# Patient Record
Sex: FEMALE | HISPANIC OR LATINO | Employment: FULL TIME | URBAN - METROPOLITAN AREA
[De-identification: names, ages, dates, MRNs, and addresses within clinical notes are randomized per-mention and may not be internally consistent; named-entity substitution may affect disease eponyms.]

---

## 2017-01-16 ENCOUNTER — HOSPITAL ENCOUNTER (EMERGENCY)
Facility: MEDICAL CENTER | Age: 22
End: 2017-01-16
Attending: EMERGENCY MEDICINE
Payer: MEDICAID

## 2017-01-16 ENCOUNTER — APPOINTMENT (OUTPATIENT)
Dept: RADIOLOGY | Facility: MEDICAL CENTER | Age: 22
End: 2017-01-16
Attending: EMERGENCY MEDICINE
Payer: MEDICAID

## 2017-01-16 VITALS
DIASTOLIC BLOOD PRESSURE: 54 MMHG | RESPIRATION RATE: 18 BRPM | HEART RATE: 95 BPM | HEIGHT: 62 IN | TEMPERATURE: 97.4 F | OXYGEN SATURATION: 98 % | BODY MASS INDEX: 37.85 KG/M2 | WEIGHT: 205.69 LBS | SYSTOLIC BLOOD PRESSURE: 108 MMHG

## 2017-01-16 DIAGNOSIS — R00.2 PALPITATIONS: Primary | ICD-10-CM

## 2017-01-16 LAB
ALBUMIN SERPL BCP-MCNC: 4.4 G/DL (ref 3.2–4.9)
ALBUMIN/GLOB SERPL: 1.4 G/DL
ALP SERPL-CCNC: 68 U/L (ref 30–99)
ALT SERPL-CCNC: 17 U/L (ref 2–50)
ANION GAP SERPL CALC-SCNC: 11 MMOL/L (ref 0–11.9)
AST SERPL-CCNC: 20 U/L (ref 12–45)
BASOPHILS # BLD AUTO: 0.3 % (ref 0–1.8)
BASOPHILS # BLD: 0.04 K/UL (ref 0–0.12)
BILIRUB SERPL-MCNC: 0.7 MG/DL (ref 0.1–1.5)
BUN SERPL-MCNC: 15 MG/DL (ref 8–22)
CALCIUM SERPL-MCNC: 9.4 MG/DL (ref 8.5–10.5)
CHLORIDE SERPL-SCNC: 105 MMOL/L (ref 96–112)
CO2 SERPL-SCNC: 23 MMOL/L (ref 20–33)
CREAT SERPL-MCNC: 0.87 MG/DL (ref 0.5–1.4)
DEPRECATED D DIMER PPP IA-ACNC: <200 NG/ML(D-DU)
EKG IMPRESSION: NORMAL
EOSINOPHIL # BLD AUTO: 0.14 K/UL (ref 0–0.51)
EOSINOPHIL NFR BLD: 1.1 % (ref 0–6.9)
ERYTHROCYTE [DISTWIDTH] IN BLOOD BY AUTOMATED COUNT: 37.6 FL (ref 35.9–50)
GFR SERPL CREATININE-BSD FRML MDRD: >60 ML/MIN/1.73 M 2
GLOBULIN SER CALC-MCNC: 3.1 G/DL (ref 1.9–3.5)
GLUCOSE SERPL-MCNC: 107 MG/DL (ref 65–99)
HCG SERPL QL: NEGATIVE
HCT VFR BLD AUTO: 42.4 % (ref 37–47)
HGB BLD-MCNC: 13.9 G/DL (ref 12–16)
IMM GRANULOCYTES # BLD AUTO: 0.03 K/UL (ref 0–0.11)
IMM GRANULOCYTES NFR BLD AUTO: 0.2 % (ref 0–0.9)
LYMPHOCYTES # BLD AUTO: 2.61 K/UL (ref 1–4.8)
LYMPHOCYTES NFR BLD: 21.2 % (ref 22–41)
MAGNESIUM SERPL-MCNC: 1.7 MG/DL (ref 1.5–2.5)
MCH RBC QN AUTO: 27.7 PG (ref 27–33)
MCHC RBC AUTO-ENTMCNC: 32.8 G/DL (ref 33.6–35)
MCV RBC AUTO: 84.5 FL (ref 81.4–97.8)
MONOCYTES # BLD AUTO: 0.91 K/UL (ref 0–0.85)
MONOCYTES NFR BLD AUTO: 7.4 % (ref 0–13.4)
NEUTROPHILS # BLD AUTO: 8.6 K/UL (ref 2–7.15)
NEUTROPHILS NFR BLD: 69.8 % (ref 44–72)
NRBC # BLD AUTO: 0 K/UL
NRBC BLD AUTO-RTO: 0 /100 WBC
PHOSPHATE SERPL-MCNC: 2.1 MG/DL (ref 2.5–4.5)
PLATELET # BLD AUTO: 239 K/UL (ref 164–446)
PMV BLD AUTO: 11 FL (ref 9–12.9)
POTASSIUM SERPL-SCNC: 3.2 MMOL/L (ref 3.6–5.5)
PROT SERPL-MCNC: 7.5 G/DL (ref 6–8.2)
RBC # BLD AUTO: 5.02 M/UL (ref 4.2–5.4)
SODIUM SERPL-SCNC: 139 MMOL/L (ref 135–145)
TSH SERPL DL<=0.005 MIU/L-ACNC: 3.2 UIU/ML (ref 0.3–3.7)
WBC # BLD AUTO: 12.3 K/UL (ref 4.8–10.8)

## 2017-01-16 PROCEDURE — 83735 ASSAY OF MAGNESIUM: CPT

## 2017-01-16 PROCEDURE — 700105 HCHG RX REV CODE 258: Performed by: EMERGENCY MEDICINE

## 2017-01-16 PROCEDURE — 94760 N-INVAS EAR/PLS OXIMETRY 1: CPT

## 2017-01-16 PROCEDURE — 85379 FIBRIN DEGRADATION QUANT: CPT

## 2017-01-16 PROCEDURE — 80053 COMPREHEN METABOLIC PANEL: CPT

## 2017-01-16 PROCEDURE — 99284 EMERGENCY DEPT VISIT MOD MDM: CPT

## 2017-01-16 PROCEDURE — 84703 CHORIONIC GONADOTROPIN ASSAY: CPT

## 2017-01-16 PROCEDURE — 84443 ASSAY THYROID STIM HORMONE: CPT

## 2017-01-16 PROCEDURE — 85025 COMPLETE CBC W/AUTO DIFF WBC: CPT

## 2017-01-16 PROCEDURE — 93005 ELECTROCARDIOGRAM TRACING: CPT | Performed by: EMERGENCY MEDICINE

## 2017-01-16 PROCEDURE — 96374 THER/PROPH/DIAG INJ IV PUSH: CPT

## 2017-01-16 PROCEDURE — 71010 DX-CHEST-PORTABLE (1 VIEW): CPT

## 2017-01-16 PROCEDURE — 84100 ASSAY OF PHOSPHORUS: CPT

## 2017-01-16 PROCEDURE — 700111 HCHG RX REV CODE 636 W/ 250 OVERRIDE (IP): Performed by: EMERGENCY MEDICINE

## 2017-01-16 RX ORDER — ALBUTEROL SULFATE 90 UG/1
2 AEROSOL, METERED RESPIRATORY (INHALATION) EVERY 4 HOURS PRN
Qty: 1 INHALER | Refills: 0 | Status: SHIPPED | OUTPATIENT
Start: 2017-01-16 | End: 2023-09-20

## 2017-01-16 RX ORDER — SODIUM CHLORIDE 9 MG/ML
1000 INJECTION, SOLUTION INTRAVENOUS ONCE
Status: COMPLETED | OUTPATIENT
Start: 2017-01-16 | End: 2017-01-16

## 2017-01-16 RX ORDER — LORAZEPAM 2 MG/ML
0.5 INJECTION INTRAMUSCULAR ONCE
Status: COMPLETED | OUTPATIENT
Start: 2017-01-16 | End: 2017-01-16

## 2017-01-16 RX ADMIN — LORAZEPAM 0.5 MG: 2 INJECTION INTRAMUSCULAR; INTRAVENOUS at 00:54

## 2017-01-16 RX ADMIN — SODIUM CHLORIDE 1000 ML: 9 INJECTION, SOLUTION INTRAVENOUS at 00:52

## 2017-01-16 ASSESSMENT — PAIN SCALES - GENERAL: PAINLEVEL_OUTOF10: 0

## 2017-01-16 NOTE — ED NOTES
Pt dc home ambulatory w family verbalized understanding of dc instruction follow up and meds vitals stable symptoms improved

## 2017-01-16 NOTE — ED AVS SNAPSHOT
1/16/2017          Bette Corey  [address]   [place] [address]    Dear Bette:    [facility] wants to ensure your discharge home is safe and you or your loved ones have had all your questions answered regarding your care after you leave the hospital.    You may receive a telephone call within two days of your discharge.  This call is to make certain you understand your discharge instructions as well as ensure we provided you with the best care possible during your stay with us.     The call will only last approximately 3-5 minutes and will be done by a nurse.    Once again, we want to ensure your discharge home is safe and that you have a clear understanding of any next steps in your care.  If you have any questions or concerns, please do not hesitate to contact us, we are here for you.  Thank you for choosing [facility] for your healthcare needs.    Sincerely,    Favian Don    [facility]

## 2017-01-16 NOTE — ED AVS SNAPSHOT
Pervacio Access Code: [email or web address]  Expires: 2/15/2017  2:38 AM    Pervacio  A secure, online tool to manage your health information     [employer]’s Pervacio® is a secure, online tool that connects you to your personalized health information from the privacy of your home -- day or night - making it very easy for you to manage your healthcare. Once the activation process is completed, you can even access your medical information using the Pervacio mustapha, which is available for free in the Apple Mustapha store or Google Play store.     Pervacio provides the following levels of access (as shown below):   My Chart Features   [facility] Primary Care Doctor [facility]  Specialists [facility]  Urgent  Care Non-[facility]  Primary Care  Doctor   Email your healthcare team securely and privately 24/7 X X X X   Manage appointments: schedule your next appointment; view details of past/upcoming appointments X      Request prescription refills. X      View recent personal medical records, including lab and immunizations X X X X   View health record, including health history, allergies, medications X X X X   Read reports about your outpatient visits, procedures, consult and ER notes X X X X   See your discharge summary, which is a recap of your hospital and/or ER visit that includes your diagnosis, lab results, and care plan. X X       How to register for Pervacio:  1. Go to  [email or web address].  2. Click on the Sign Up Now box, which takes you to the New Member Sign Up page. You will need to provide the following information:  a. Enter your Pervacio Access Code exactly as it appears at the top of this page. (You will not need to use this code after you’ve completed the sign-up process. If you do not sign up before the expiration date, you must request a new code.)   b. Enter your date of birth.   c. Enter your home email address.   d. Click Submit, and follow the next screen’s instructions.  3. Create a Pervacio ID. This will be your Pervacio  login ID and cannot be changed, so think of one that is secure and easy to remember.  4. Create a Yapta password. You can change your password at any time.  5. Enter your Password Reset Question and Answer. This can be used at a later time if you forget your password.   6. Enter your e-mail address. This allows you to receive e-mail notifications when new information is available in Yapta.  7. Click Sign Up. You can now view your health information.    For assistance activating your Yapta account, call [phone]

## 2017-01-16 NOTE — ED NOTES
PT to room in wheel chair, was able to transfer to [facility] with no difficulty or assistance.RN aware.

## 2017-01-16 NOTE — ED PROVIDER NOTES
"ED Provider Note    CHIEF COMPLAINT  Chief Complaint   Patient presents with   • Rapid Heart Beat   • Difficulty Breathing     hx asthma   • Shaking       HPI  Bette Corey is a 21 y.o. female who presents to the emergency department with her family complaining of palpitations. Patient states that she has history of asthma, rare use of albuterol, exercise this evening and felt onset of palpitations during which increased after her exercise. Her exercise is limited due to her symptoms, palpitations or shortness of breath. No chest pain or tightness. Patient does describe feeling \"shaky,\" and is tremulous. Nausea without vomiting. Decreased appetite this evening. No syncope. Symptoms have persisted for more than 2-3 hours. She did try her albuterol inhaler without improvement in symptoms. Patient denies caffeine, energy drink or diet pill. Denies recent travel or sick contacts. She does have an IUD, Mirena for 2 and half years. She does describe a upwards of 30 pound weight gain over the past few months, without lifestyle changes, she did quit breast-feeding at that time.    REVIEW OF SYSTEMS  See HPI for further details. All other systems are negative.     PAST MEDICAL HISTORY    asthma, mild, controlled    SOCIAL HISTORY  Social History     Social History Main Topics   • Smoking status: Never Smoker    • Smokeless tobacco: Not on file   • Alcohol Use: No   • Drug Use: No   • Sexual Activity: Not on file       SURGICAL HISTORY   has past surgical history that includes other.    CURRENT MEDICATIONS  Home Medications     **Home medications have not yet been reviewed for this encounter**          ALLERGIES  Allergies   Allergen Reactions   • Other Food      peanuts       PHYSICAL EXAM  VITAL SIGNS: /63 mmHg  Pulse 83  Temp(Src) 36.3 °C (97.4 °F)  Resp 20  Ht 1.575 m (5' 2\")  Wt 93.3 kg (205 lb 11 oz)  BMI 37.61 kg/m2  SpO2 98%  Pulse ox interpretation: I interpret this pulse ox as " normal.  Constitutional: Alert in no apparent distress. Tremulous.  HENT: Normocephalic, atraumatic. Bilateral external ears normal, Nose normal. Moist mucous membranes.    Eyes: Pupils are equal and reactive, Conjunctiva normal.   Neck: Normal range of motion, Supple. No anterior midline mass.  Lymphatic: No lymphadenopathy noted.   Cardiovascular: Tachycardia otherwise regular rate and rhythm, no murmurs. Distal pulses intact.  No peripheral edema.  Thorax & Lungs: Normal breath sounds.  No wheezing/rales/ronchi. No increased work of breathing, clipped speech or retractions.   Abdomen: Soft, non-distended, non-tender to palpation.   Skin: Warm, Dry, No erythema, No rash.   Musculoskeletal: Good range of motion in all major joints.   Neurologic: Alert , no gross focal deficit noted.  Psychiatric: Affect normal, Judgment normal, Mood normal.       DIAGNOSTIC STUDIES / PROCEDURES    EKG  I interpreted this EKG myself.  This is a 12-lead study.  The rhythm is sinus with a rate of 92.  There are no ST segment abnormalities. Isolated T-wave flattening, inversion lead 3. Normal intervals.   Interpretation: No ST segment elevation myocardial infarction. No ectopy. No arrhythmia.  No previous EKG for comparison.    LABS  Results for orders placed or performed during the hospital encounter of 01/16/17   CBC WITH DIFFERENTIAL   Result Value Ref Range    WBC 12.3 (H) 4.8 - 10.8 K/uL    RBC 5.02 4.20 - 5.40 M/uL    Hemoglobin 13.9 12.0 - 16.0 g/dL    Hematocrit 42.4 37.0 - 47.0 %    MCV 84.5 81.4 - 97.8 fL    MCH 27.7 27.0 - 33.0 pg    MCHC 32.8 (L) 33.6 - 35.0 g/dL    RDW 37.6 35.9 - 50.0 fL    Platelet Count 239 164 - 446 K/uL    MPV 11.0 9.0 - 12.9 fL    Neutrophils-Polys 69.80 44.00 - 72.00 %    Lymphocytes 21.20 (L) 22.00 - 41.00 %    Monocytes 7.40 0.00 - 13.40 %    Eosinophils 1.10 0.00 - 6.90 %    Basophils 0.30 0.00 - 1.80 %    Immature Granulocytes 0.20 0.00 - 0.90 %    Nucleated RBC 0.00 /100 WBC    Neutrophils  (Absolute) 8.60 (H) 2.00 - 7.15 K/uL    Lymphs (Absolute) 2.61 1.00 - 4.80 K/uL    Monos (Absolute) 0.91 (H) 0.00 - 0.85 K/uL    Eos (Absolute) 0.14 0.00 - 0.51 K/uL    Baso (Absolute) 0.04 0.00 - 0.12 K/uL    Immature Granulocytes (abs) 0.03 0.00 - 0.11 K/uL    NRBC (Absolute) 0.00 K/uL   COMP METABOLIC PANEL   Result Value Ref Range    Sodium 139 135 - 145 mmol/L    Potassium 3.2 (L) 3.6 - 5.5 mmol/L    Chloride 105 96 - 112 mmol/L    Co2 23 20 - 33 mmol/L    Anion Gap 11.0 0.0 - 11.9    Glucose 107 (H) 65 - 99 mg/dL    Bun 15 8 - 22 mg/dL    Creatinine 0.87 0.50 - 1.40 mg/dL    Calcium 9.4 8.5 - 10.5 mg/dL    AST(SGOT) 20 12 - 45 U/L    ALT(SGPT) 17 2 - 50 U/L    Alkaline Phosphatase 68 30 - 99 U/L    Total Bilirubin 0.7 0.1 - 1.5 mg/dL    Albumin 4.4 3.2 - 4.9 g/dL    Total Protein 7.5 6.0 - 8.2 g/dL    Globulin 3.1 1.9 - 3.5 g/dL    A-G Ratio 1.4 g/dL   D-DIMER (only helpful in low pre-test probability wells critieria. Do not order if patient ruled out by PERC criteria. See Weblinks at top of Labs section)   Result Value Ref Range    D-Dimer Screen <200 <250 ng/mL(D-DU)   HCG Qual Serum   Result Value Ref Range    Beta-Hcg Qualitative Serum Negative Negative   TSH   Result Value Ref Range    TSH 3.200 0.300 - 3.700 uIU/mL   MAGNESIUM   Result Value Ref Range    Magnesium 1.7 1.5 - 2.5 mg/dL   PHOSPHORUS   Result Value Ref Range    Phosphorus 2.1 (L) 2.5 - 4.5 mg/dL   ESTIMATED GFR   Result Value Ref Range    GFR If African American >60 >60 mL/min/1.73 m 2    GFR If Non African American >60 >60 mL/min/1.73 m 2   EKG (NOW)   Result Value Ref Range    Report       [facility] Emergency Dept.    Test Date:  2017  Pt Name:    JARAD ARNOLD             Department: ER  MRN:        [id number]                      Room:       [facility]  Gender:     F                            Technician: [id number]  :        1995                   Requested By:RENATO TAYLOR  Order #:    [id number]                     Reading MD: SUE TAYLOR DO    Measurements  Intervals                                Axis  Rate:       92                           P:          24  ME:         168                          QRS:        74  QRSD:       84                           T:          27  QT:         348  QTc:        431    Interpretive Statements  SINUS RHYTHM  No previous ECG available for comparison    Electronically Signed On 1- 2:39:11 PST by SUE TAYLOR DO         RADIOLOGY  DX-CHEST-PORTABLE (1 VIEW)   Final Result      No acute cardiac or pulmonary abnormality is noted.          COURSE & MEDICAL DECISION MAKING  Nursing notes and vital signs were reviewed. (See chart for details)  The patients records were reviewed, history was obtained from the patient;    Evaluation of palpitations is unrevealing. Physical exam as above, patient was quite tremulous on arrival, mild tachycardia, otherwise unremarkable and neurologically intact. Labs are unremarkable, and the leukocytosis, anemia, electrolyte derangement. Normal thyroid study. PERC positive for tachycardia, low risk wells, d-dimer negative, doubt pulmonary embolism. No clinical or radiographic evidence for pneumonia, pneumothorax.  Patient received 1 L of IV fluid and 0.5 mg of IV Ativan complete resolution of her symptoms. There is no history for syncope. EKG is unremarkable.     Patient is stable for discharge at this time, anticipatory guidance provided, albuterol has been refilled, close follow-up is encouraged, and strict ED return instructions have been detailed. Patient is agreeable to the disposition and plan.    Patient's blood pressure was elevated in the emergency department, and has been referred to primary care for close monitoring.      FINAL IMPRESSION  (R00.2) Palpitations  (primary encounter diagnosis)      Electronically signed by: Sue Taylor, 1/16/2017 12:48 AM      This dictation was created using voice recognition software. The  accuracy of the dictation is limited to the abilities of the software. I expect there may be some errors of grammar and possibly content. The nursing notes were reviewed and certain aspects of this information were incorporated into this note.        Patient denies illicit drug use.

## 2017-01-16 NOTE — ED AVS SNAPSHOT
After Visit Summary                                                                                                                Bette Corey   MRN: [id number]    Department:  [facility], Emergency Dept   Date of Visit:  1/16/2017            [facility], Emergency Dept    [address] [address]    Phone:  [phone]      You were seen by     Sue Salgado D.O.      Your Diagnosis Was     Palpitations     R00.2       These are the medications you received during your hospitalization from 01/16/2017 0001 to 01/16/2017 0238     Date/Time Order Dose Route Action    01/16/2017 0052 NS infusion 1,000 mL 1,000 mL Intravenous New Bag    01/16/2017 0054 lorazepam (ATIVAN) injection 0.5 mg 0.5 mg Intravenous Given      Follow-up Information     1. Follow up with [facility] In 2 days.    Contact information    [address]  [facility]  [phone]          2. Follow up with [facility] In 2 days.    Contact information    [facility] [address] [phone]          3. Follow up with Lizzeth Soliman M.D. In 2 days.    Specialty:  Family Medicine    Contact information    [address]  [facility] [address] [phone]          4. Follow up with [facility] In 2 days.    Contact information    [address] [address] [phone]      Medication Information     Review all of your home medications and newly ordered medications with your primary doctor and/or pharmacist as soon as possible. Follow medication instructions as directed by your doctor and/or pharmacist.     Please keep your complete medication list with you and share with your physician. Update the information when medications are discontinued, doses are changed, or new medications (including over-the-counter products) are added; and carry medication information at all times in the event of emergency situations.               Medication List      START  taking these medications        Instructions    albuterol 108 (90 BASE) MCG/ACT Aers inhalation aerosol    Inhale 2 Puffs by mouth every four hours as needed for Shortness of Breath.   Dose:  2 Puff         ASK your doctor about these medications        Instructions    hydrocodone-acetaminophen 5-325 MG Tabs per tablet   Commonly known as:  NORCO    Take 1-2 Tabs by mouth every four hours as needed.   Dose:  1-2 Tab       ondansetron 4 MG Tabs tablet   Commonly known as:  ZOFRAN    Take 1 Tab by mouth every 8 hours as needed for Nausea/Vomiting.   Dose:  4 mg               Procedures and tests performed during your visit     CBC WITH DIFFERENTIAL    COMP METABOLIC PANEL    Cardiac Monitoring    D-DIMER (only helpful in low pre-test probability wells critieria. Do not order if patient ruled out by PERC criteria. See Weblinks at top of Labs section)    DX-CHEST-PORTABLE (1 VIEW)    EKG (NOW)    ESTIMATED GFR    HCG Qual Serum    IV Saline Lock    MAGNESIUM    PHOSPHORUS    Pulse Ox    TSH        Discharge Instructions       Follow up with primary care this week for reevaluation, to establish care, for close blood pressure monitoring and medication management.    Albuterol, 2 puffs inhaled every 4-6 hours as needed for dyspnea or wheezing for chronic asthma.  Encourage oral fluid hydration and well-balanced diet.  Activity exercise as tolerated.  Avoid caffeine, alcohol.    Return to emergency department for persistent palpitations, chest pain, shortness of breath, syncope, fever or other new concerns.    Palpitations  A palpitation is the feeling that your heartbeat is irregular. It may feel like your heart is fluttering or skipping a beat. It may also feel like your heart is beating faster than normal. This is usually not a serious problem. In some cases, you may need more medical tests.  HOME CARE  · Avoid:  ¨ Caffeine in coffee, tea, soft drinks, diet pills, and energy drinks.  ¨ Chocolate.  ¨ Alcohol.  · Stop  smoking if you smoke.  · Reduce your stress and anxiety. Try:  ¨ A method that measures bodily functions so you can learn to control them (biofeedback).  ¨ Yoga.  ¨ Meditation.  ¨ Physical activity such as swimming, jogging, or walking.  · Get plenty of rest and sleep.  GET HELP IF:  · Your fast or irregular heartbeat continues after 24 hours.  · Your palpitations occur more often.  GET HELP RIGHT AWAY IF:   · You have chest pain.  · You feel short of breath.  · You have a very bad headache.  · You feel dizzy or pass out (faint).  MAKE SURE YOU:   · Understand these instructions.  · Will watch your condition.  · Will get help right away if you are not doing well or get worse.     This information is not intended to replace advice given to you by your health care provider. Make sure you discuss any questions you have with your health care provider.     Document Released: 09/26/2009 Document Revised: 01/08/2016 Document Reviewed: 02/15/2013  [employer] Interactive Patient Education ©2016 [employer]            Patient Information     Patient Information    Following emergency treatment: all patient requiring follow-up care must return either to a private physician or a clinic if your condition worsens before you are able to obtain further medical attention, please return to the emergency room.     Billing Information    At [facility], we work to make the billing process streamlined for our patients.  Our Representatives are here to answer any questions you may have regarding your hospital bill.  If you have insurance coverage and have supplied your insurance information to us, we will submit a claim to your insurer on your behalf.  Should you have any questions regarding your bill, we can be reached online or by phone as follows:  Online: You are able pay your bills online or live chat with our representatives about any billing questions you may have. We are here to help Monday - Friday from 8:00am to 7:30pm and  9:00am - 12:00pm on Saturdays.  Please visit [email or web address]  for more information.   Phone:  [phone] or [phone]    Please note that your emergency physician, surgeon, pathologist, radiologist, anesthesiologist, and other specialists are not employed by [facility] and will therefore bill separately for their services.  Please contact them directly for any questions concerning their bills at the numbers below:     Emergency Physician Services:  [phone]  [facility]:  [phone]  [facility]:  [phone]  [facility]:  [phone]    1. Your final bill may vary from the amount quoted upon discharge if all procedures are not complete at that time, or if your doctor has additional procedures of which we are not aware. You will receive an additional bill if you return to the Emergency Department at [facility] for suture removal regardless of the facility of which the sutures were placed.     2. Please arrange for settlement of this account at the emergency registration.    3. All self-pay accounts are due in full at the time of treatment.  If you are unable to meet this obligation then payment is expected within 4-5 days.     4. If you have had radiology studies (CT, X-ray, Ultrasound, MRI), you have received a preliminary result during your emergency department visit. Please contact the radiology department [phone] to receive a copy of your final result. Please discuss the Final result with your primary physician or with the follow up physician provided.     Crisis Hotline:  [place] Crisis Hotline:  3-472-SYVHVTX or 1-499.919.6171  [place] Crisis Hotline:    1-235.313.3342 or 557-447-0003         ED Discharge Follow Up Questions    1. In order to provide you with very good care, we would like to follow up with a phone call in the next few days.  May we have your permission to contact you?     YES /   NO    2. What is the best phone number to call you? (       )_____-__________    3. What is the best time to call you?      Morning  /  Afternoon  /  Evening                   Patient Signature:  ____________________________________________________________    Date:  ____________________________________________________________

## 2017-01-16 NOTE — DISCHARGE INSTRUCTIONS
Follow up with primary care this week for reevaluation, to establish care, for close blood pressure monitoring and medication management.    Albuterol, 2 puffs inhaled every 4-6 hours as needed for dyspnea or wheezing for chronic asthma.  Encourage oral fluid hydration and well-balanced diet.  Activity exercise as tolerated.  Avoid caffeine, alcohol.    Return to emergency department for persistent palpitations, chest pain, shortness of breath, syncope, fever or other new concerns.    Palpitations  A palpitation is the feeling that your heartbeat is irregular. It may feel like your heart is fluttering or skipping a beat. It may also feel like your heart is beating faster than normal. This is usually not a serious problem. In some cases, you may need more medical tests.  HOME CARE  · Avoid:  ¨ Caffeine in coffee, tea, soft drinks, diet pills, and energy drinks.  ¨ Chocolate.  ¨ Alcohol.  · Stop smoking if you smoke.  · Reduce your stress and anxiety. Try:  ¨ A method that measures bodily functions so you can learn to control them (biofeedback).  ¨ Yoga.  ¨ Meditation.  ¨ Physical activity such as swimming, jogging, or walking.  · Get plenty of rest and sleep.  GET HELP IF:  · Your fast or irregular heartbeat continues after 24 hours.  · Your palpitations occur more often.  GET HELP RIGHT AWAY IF:   · You have chest pain.  · You feel short of breath.  · You have a very bad headache.  · You feel dizzy or pass out (faint).  MAKE SURE YOU:   · Understand these instructions.  · Will watch your condition.  · Will get help right away if you are not doing well or get worse.     This information is not intended to replace advice given to you by your health care provider. Make sure you discuss any questions you have with your health care provider.     Document Released: 09/26/2009 Document Revised: 01/08/2016 Document Reviewed: 02/15/2013  [employer] Interactive Patient Education ©2016 [employer]

## 2017-01-16 NOTE — ED NOTES
Pt to triage by w/c, states she has hx asthma & tried to exercise tonight but felt like her heart was racing, difficulty breathing & shaky.     in triage, in no obvious distress.

## 2018-04-23 ENCOUNTER — HOSPITAL ENCOUNTER (EMERGENCY)
Facility: MEDICAL CENTER | Age: 23
End: 2018-04-23
Attending: EMERGENCY MEDICINE
Payer: MEDICAID

## 2018-04-23 VITALS
TEMPERATURE: 97.8 F | WEIGHT: 175.93 LBS | SYSTOLIC BLOOD PRESSURE: 107 MMHG | RESPIRATION RATE: 16 BRPM | BODY MASS INDEX: 32.37 KG/M2 | HEIGHT: 62 IN | DIASTOLIC BLOOD PRESSURE: 57 MMHG | OXYGEN SATURATION: 98 % | HEART RATE: 66 BPM

## 2018-04-23 DIAGNOSIS — N93.9 VAGINAL BLEEDING: ICD-10-CM

## 2018-04-23 LAB
APPEARANCE UR: CLEAR
BACTERIA #/AREA URNS HPF: ABNORMAL /HPF
BASOPHILS # BLD AUTO: 0.8 % (ref 0–1.8)
BASOPHILS # BLD: 0.04 K/UL (ref 0–0.12)
BILIRUB UR QL STRIP.AUTO: NEGATIVE
COLOR UR: YELLOW
EOSINOPHIL # BLD AUTO: 0.29 K/UL (ref 0–0.51)
EOSINOPHIL NFR BLD: 5.7 % (ref 0–6.9)
EPI CELLS #/AREA URNS HPF: ABNORMAL /HPF
ERYTHROCYTE [DISTWIDTH] IN BLOOD BY AUTOMATED COUNT: 41.8 FL (ref 35.9–50)
GLUCOSE UR STRIP.AUTO-MCNC: NEGATIVE MG/DL
HCG SERPL QL: NEGATIVE
HCT VFR BLD AUTO: 39.8 % (ref 37–47)
HGB BLD-MCNC: 12.9 G/DL (ref 12–16)
HYALINE CASTS #/AREA URNS LPF: ABNORMAL /LPF
IMM GRANULOCYTES # BLD AUTO: 0.01 K/UL (ref 0–0.11)
IMM GRANULOCYTES NFR BLD AUTO: 0.2 % (ref 0–0.9)
KETONES UR STRIP.AUTO-MCNC: NEGATIVE MG/DL
LEUKOCYTE ESTERASE UR QL STRIP.AUTO: ABNORMAL
LYMPHOCYTES # BLD AUTO: 2.07 K/UL (ref 1–4.8)
LYMPHOCYTES NFR BLD: 40.6 % (ref 22–41)
MCH RBC QN AUTO: 28.4 PG (ref 27–33)
MCHC RBC AUTO-ENTMCNC: 32.4 G/DL (ref 33.6–35)
MCV RBC AUTO: 87.7 FL (ref 81.4–97.8)
MICRO URNS: ABNORMAL
MONOCYTES # BLD AUTO: 0.48 K/UL (ref 0–0.85)
MONOCYTES NFR BLD AUTO: 9.4 % (ref 0–13.4)
NEUTROPHILS # BLD AUTO: 2.21 K/UL (ref 2–7.15)
NEUTROPHILS NFR BLD: 43.3 % (ref 44–72)
NITRITE UR QL STRIP.AUTO: NEGATIVE
NRBC # BLD AUTO: 0 K/UL
NRBC BLD-RTO: 0 /100 WBC
PH UR STRIP.AUTO: 5.5 [PH]
PLATELET # BLD AUTO: 183 K/UL (ref 164–446)
PMV BLD AUTO: 11.7 FL (ref 9–12.9)
PROT UR QL STRIP: NEGATIVE MG/DL
RBC # BLD AUTO: 4.54 M/UL (ref 4.2–5.4)
RBC # URNS HPF: ABNORMAL /HPF
RBC UR QL AUTO: ABNORMAL
SP GR UR STRIP.AUTO: 1.01
UROBILINOGEN UR STRIP.AUTO-MCNC: 0.2 MG/DL
WBC # BLD AUTO: 5.1 K/UL (ref 4.8–10.8)
WBC #/AREA URNS HPF: ABNORMAL /HPF

## 2018-04-23 PROCEDURE — 99284 EMERGENCY DEPT VISIT MOD MDM: CPT

## 2018-04-23 PROCEDURE — 85025 COMPLETE CBC W/AUTO DIFF WBC: CPT

## 2018-04-23 PROCEDURE — 84703 CHORIONIC GONADOTROPIN ASSAY: CPT

## 2018-04-23 PROCEDURE — 81001 URINALYSIS AUTO W/SCOPE: CPT

## 2018-04-23 ASSESSMENT — PAIN SCALES - GENERAL: PAINLEVEL_OUTOF10: 8

## 2018-04-23 NOTE — ED NOTES
PT ambulated to [facility], chart up for MD.  PT to restroom for Urine sample.  PT unable to provide sample at this time     PT into gown, blankets given for comfort

## 2018-04-23 NOTE — ED TRIAGE NOTES
24 y/o female ambulatory to triage with c/o heavy vaginal bleeding that began this morning. Pt states she soaked through three regular sized tampons since she woke up at 4am. Pt states she has had intermittent spotting x 3 weeks. Denies chance of pregnancy but states she has had one miscarriage in the past.

## 2018-04-23 NOTE — ED PROVIDER NOTES
ED Provider Note    CHIEF COMPLAINT  Chief Complaint   Patient presents with   • Vaginal Bleeding     heavy bleeding today, spotting x 3 weeks       HPI  Bette Corey is a 23 y.o. female  here for evaluation of vaginal bleeding. The patient states that she had a normal period last month, but approximately 1-2 weeks ago, she had some spotting. Today she progressed to heavier vaginal bleeding with 3 tampons being used prior to arrival. She has no dizziness, no lightheadedness, and no weakness. She denies any history of the same. She states that she is on the Mirena for birth control, and she denies any chance of pregnancy. She has no dysuria, urgency or frequency. She has some lower abdominal cramping, but has not taken anything for the pain. She describes a cramping as aching and intermittent sharp.    PAST MEDICAL HISTORY   none    Family history; no bleeding disorders    SOCIAL HISTORY  Social History     Social History Main Topics   • Smoking status: Never Smoker   • Smokeless tobacco: Not on file   • Alcohol use No   • Drug use: No   • Sexual activity: Not on file       SURGICAL HISTORY   has a past surgical history that includes other.    CURRENT MEDICATIONS  Home Medications    **Home medications have not yet been reviewed for this encounter**         ALLERGIES  Allergies   Allergen Reactions   • Other Food      peanuts       REVIEW OF SYSTEMS  See HPI for further details. Review of systems as above, otherwise all other systems are negative.     PHYSICAL EXAM  Constitutional: Well developed, well nourished. No acute distress.  HEENT: Normocephalic, atraumatic. Posterior pharynx clear and moist.  Eyes:  EOMI. Normal sclera.  Neck: Supple, Full range of motion, nontender.  Chest/Pulmonary: clear to ausculation. Symmetrical expansion.   Cardio: Regular rate and rhythm with no murmur.   Abdomen: Soft, mild suprapubic tenderness, No peritoneal signs. No guarding. No palpable masses.  Back: No CVA  tenderness, nontender midline, no step offs.  Musculoskeletal: No deformity, no edema, neurovascular intact.   Neuro: Clear speech, appropriate, cooperative, cranial nerves II-XII grossly intact.  Psych: Normal mood and affect    Results for orders placed or performed during the hospital encounter of 04/23/18   CBC WITH DIFFERENTIAL   Result Value Ref Range    WBC 5.1 4.8 - 10.8 K/uL    RBC 4.54 4.20 - 5.40 M/uL    Hemoglobin 12.9 12.0 - 16.0 g/dL    Hematocrit 39.8 37.0 - 47.0 %    MCV 87.7 81.4 - 97.8 fL    MCH 28.4 27.0 - 33.0 pg    MCHC 32.4 (L) 33.6 - 35.0 g/dL    RDW 41.8 35.9 - 50.0 fL    Platelet Count 183 164 - 446 K/uL    MPV 11.7 9.0 - 12.9 fL    Neutrophils-Polys 43.30 (L) 44.00 - 72.00 %    Lymphocytes 40.60 22.00 - 41.00 %    Monocytes 9.40 0.00 - 13.40 %    Eosinophils 5.70 0.00 - 6.90 %    Basophils 0.80 0.00 - 1.80 %    Immature Granulocytes 0.20 0.00 - 0.90 %    Nucleated RBC 0.00 /100 WBC    Neutrophils (Absolute) 2.21 2.00 - 7.15 K/uL    Lymphs (Absolute) 2.07 1.00 - 4.80 K/uL    Monos (Absolute) 0.48 0.00 - 0.85 K/uL    Eos (Absolute) 0.29 0.00 - 0.51 K/uL    Baso (Absolute) 0.04 0.00 - 0.12 K/uL    Immature Granulocytes (abs) 0.01 0.00 - 0.11 K/uL    NRBC (Absolute) 0.00 K/uL   URINALYSIS CULTURE, IF INDICATED   Result Value Ref Range    Color Yellow     Character Clear     Specific Gravity 1.013 <1.035    Ph 5.5 5.0 - 8.0    Glucose Negative Negative mg/dL    Ketones Negative Negative mg/dL    Protein Negative Negative mg/dL    Bilirubin Negative Negative    Urobilinogen, Urine 0.2 Negative    Nitrite Negative Negative    Leukocyte Esterase Trace (A) Negative    Occult Blood Large (A) Negative    Micro Urine Req Microscopic    HCG QUAL SERUM   Result Value Ref Range    Beta-Hcg Qualitative Serum Negative Negative   URINE MICROSCOPIC (W/UA)   Result Value Ref Range    WBC 5-10 (A) /hpf    RBC 5-10 (A) /hpf    Bacteria Few (A) None /hpf    Epithelial Cells Few /hpf    Hyaline Cast 0-2 /lpf          PROCEDURES     MEDICAL RECORD  I have reviewed patient's medical record and pertinent results are listed above.    COURSE & MEDICAL DECISION MAKING  I have reviewed any medical record information, laboratory studies and radiographic results as noted above.    2:19 PM  The patient is nontoxic appearing, with a stable H&H, and has no dizziness. She agrees to follow-up with the GYN next 1-2 days, or come here for further issues or concerns.    If you have had any blood pressure issues while here in the emergency department, please see your doctor for a further evaluation or work up.    Differential diagnoses include but not limited to: vaginal bleeding, pregnancy,    This patient presents with vaginal bleeding .  At this time, I have counseled the patient/family regarding their medications, pain control, and follow up.  They will continue their medications, if any, as prescribed.  They will return immediately for any worsening symptoms and/or any other medical concerns.  They will see their doctor, or contact the doctor provided, in 1-2 days for follow up.       FINAL IMPRESSION  1. Vaginal bleeding            Electronically signed by: Salvatore Daigle, 4/23/2018 11:41 AM

## 2018-04-23 NOTE — DISCHARGE INSTRUCTIONS
Menorrhagia  Menorrhagia is the medical term for when your menstrual periods are heavy or last longer than usual. With menorrhagia, every period you have may cause enough blood loss and cramping that you are unable to maintain your usual activities.  CAUSES   In some cases, the cause of heavy periods is unknown, but a number of conditions may cause menorrhagia. Common causes include:  · A problem with the hormone-producing thyroid gland (hypothyroid).  · Noncancerous growths in the uterus (polyps or fibroids).  · An imbalance of the estrogen and progesterone hormones.  · One of your ovaries not releasing an egg during one or more months.  · Side effects of having an intrauterine device (IUD).  · Side effects of some medicines, such as anti-inflammatory medicines or blood thinners.  · A bleeding disorder that stops your blood from clotting normally.  SIGNS AND SYMPTOMS   During a normal period, bleeding lasts between 4 and 8 days. Signs that your periods are too heavy include:  · You routinely have to change your pad or tampon every 1 or 2 hours because it is completely soaked.  · You pass blood clots larger than 1 inch (2.5 cm) in size.  · You have bleeding for more than 7 days.  · You need to use pads and tampons at the same time because of heavy bleeding.  · You need to wake up to change your pads or tampons during the night.  · You have symptoms of anemia, such as tiredness, fatigue, or shortness of breath.   DIAGNOSIS   Your health care provider will perform a physical exam and ask you questions about your symptoms and menstrual history. Other tests may be ordered based on what the health care provider finds during the exam. These tests can include:  · Blood tests. Blood tests are used to check if you are pregnant or have hormonal changes, a bleeding or thyroid disorder, low iron levels (anemia), or other problems.  · Endometrial biopsy. Your health care provider takes a sample of tissue from the inside of your  uterus to be examined under a microscope.  · Pelvic ultrasound. This test uses sound waves to make a picture of your uterus, ovaries, and vagina. The pictures can show if you have fibroids or other growths.  · Hysteroscopy. For this test, your health care provider will use a small telescope to look inside your uterus.  Based on the results of your initial tests, your health care provider may recommend further testing.  TREATMENT   Treatment may not be needed. If it is needed, your health care provider may recommend treatment with one or more medicines first. If these do not reduce bleeding enough, a surgical treatment might be an option. The best treatment for you will depend on:   · Whether you need to prevent pregnancy.    · Your desire to have children in the future.  · The cause and severity of your bleeding.  · Your opinion and personal preference.    Medicines for menorrhagia may include:  · Birth control methods that use hormones. These include the pill, skin patch, vaginal ring, shots that you get every 3 months, hormonal IUD, and implant. These treatments reduce bleeding during your menstrual period.  · Medicines that thicken blood and slow bleeding.  · Medicines that reduce swelling, such as ibuprofen.   · Medicines that contain a synthetic hormone called progestin.    · Medicines that make the ovaries stop working for a short time.    You may need surgical treatment for menorrhagia if the medicines are unsuccessful. Treatment options include:  · Dilation and curettage (D&C). In this procedure, your health care provider opens (dilates) your cervix and then scrapes or suctions tissue from the lining of your uterus to reduce menstrual bleeding.  · Operative hysteroscopy. This procedure uses a tiny tube with a light (hysteroscope) to view your uterine cavity and can help in the surgical removal of a polyp that may be causing heavy periods.  · Endometrial ablation. Through various techniques, your health care  provider permanently destroys the entire lining of your uterus (endometrium). After endometrial ablation, most women have little or no menstrual flow. Endometrial ablation reduces your ability to become pregnant.  · Endometrial resection. This surgical procedure uses an electrosurgical wire loop to remove the lining of the uterus. This procedure also reduces your ability to become pregnant.  · Hysterectomy. Surgical removal of the uterus and cervix is a permanent procedure that stops menstrual periods. Pregnancy is not possible after a hysterectomy. This procedure requires anesthesia and hospitalization.  HOME CARE INSTRUCTIONS   · Only take over-the-counter or prescription medicines as directed by your health care provider. Take prescribed medicines exactly as directed. Do not change or switch medicines without consulting your health care provider.  · Take any prescribed iron pills exactly as directed by your health care provider. Long-term heavy bleeding may result in low iron levels. Iron pills help replace the iron your body lost from heavy bleeding. Iron may cause constipation. If this becomes a problem, increase the bran, fruits, and roughage in your diet.  · Do not take aspirin or medicines that contain aspirin 1 week before or during your menstrual period. Aspirin may make the bleeding worse.  · If you need to change your sanitary pad or tampon more than once every 2 hours, stay in bed and rest as much as possible until the bleeding stops.  · Eat well-balanced meals. Eat foods high in iron. Examples are leafy green vegetables, meat, liver, eggs, and whole grain breads and cereals. Do not try to lose weight until the abnormal bleeding has stopped and your blood iron level is back to normal.  SEEK MEDICAL CARE IF:   · You soak through a pad or tampon every 1 or 2 hours, and this happens every time you have a period.  · You need to use pads and tampons at the same time because you are bleeding so much.  · You  need to change your pad or tampon during the night.  · You have a period that lasts for more than 8 days.  · You pass clots bigger than 1 inch wide.  · You have irregular periods that happen more or less often than once a month.  · You feel dizzy or faint.  · You feel very weak or tired.  · You feel short of breath or feel your heart is beating too fast when you exercise.  · You have nausea and vomiting or diarrhea while you are taking your medicine.  · You have any problems that may be related to the medicine you are taking.  SEEK IMMEDIATE MEDICAL CARE IF:   · You soak through 4 or more pads or tampons in 2 hours.  · You have any bleeding while you are pregnant.  MAKE SURE YOU:   · Understand these instructions.  Will watch your condition.  Metrorrhagia  Metrorrhagia is bleeding from the uterus that happens irregularly but often. The bleeding generally happens between menstrual periods.  Follow these instructions at home:  Pay attention to any changes in your symptoms. Follow these instructions to help with your condition:  Eating and drinking  · Eat well-balanced meals. Include foods that are high in iron, such as liver, meat, shellfish, green leafy vegetables, and eggs.  · If you become constipated:  ¨ Drink plenty of water.  ¨ Eat fruits and vegetables that are high in water and fiber, such as spinach, carrots, raspberries, apples, and luisa.  Medicines  · Take over-the-counter and prescription medicines only as told by your health care provider.  · Do not change medicines without talking with your health care provider.  · Aspirin or medicines that contain aspirin may make the bleeding worse. Do not take those medicines:  ¨ During the week before your period.  ¨ During your period.  · If you were prescribed iron pills, take them as told by your health care provider. Iron pills help to replace iron that your body loses because of this condition.  Activity  · If you need to change your sanitary pad or tampon more  than one time every 2 hours:  ¨ Lie in bed with your feet raised (elevated).  ¨ Place a cold pack on your lower abdomen.  ¨ Rest as much as possible until the bleeding stops or slows down.  · Do not try to lose weight until the bleeding has stopped and your blood iron level is back to normal.  Other Instructions  · For two months, write down:  ¨ When your period starts.  ¨ When your period ends.  ¨ When any abnormal bleeding occurs.  ¨ What problems you notice.  · Keep all follow-up visits as told by your health care provider. This is important.  Contact a health care provider if:  · You get light-headed or weak.  · You have nausea and vomiting.  · You cannot eat or drink without vomiting.  · You feel dizzy or have diarrhea while you are taking medicine.  · You are taking birth control pills or hormones, and you want to change them or stop taking them.  Get help right away if:  · You develop a fever or chills.  · You need to change your sanitary pad or tampon more than one time per hour.  · Your bleeding becomes heavy.  · Your flow contains clots.  · You develop pain in your abdomen.  · You lose consciousness.  · You develop a rash.  This information is not intended to replace advice given to you by your health care provider. Make sure you discuss any questions you have with your health care provider.  Document Released: 12/18/2006 Document Revised: 05/22/2017 Document Reviewed: 03/14/2016  [employer] Interactive Patient Education © 2017 [employer]  ·   · Will get help right away if you are not doing well or get worse.  This information is not intended to replace advice given to you by your health care provider. Make sure you discuss any questions you have with your health care provider.  Document Released: 12/18/2006 Document Revised: 04/10/2017 Document Reviewed: 06/08/2014  [employer] Interactive Patient Education © 2017 [employer]

## 2018-10-04 ENCOUNTER — OFFICE VISIT (OUTPATIENT)
Dept: URGENT CARE | Facility: CLINIC | Age: 23
End: 2018-10-04

## 2018-10-04 VITALS
SYSTOLIC BLOOD PRESSURE: 124 MMHG | BODY MASS INDEX: 32.39 KG/M2 | HEIGHT: 62 IN | DIASTOLIC BLOOD PRESSURE: 76 MMHG | OXYGEN SATURATION: 98 % | TEMPERATURE: 98.6 F | RESPIRATION RATE: 18 BRPM | WEIGHT: 176 LBS | HEART RATE: 82 BPM

## 2018-10-04 DIAGNOSIS — Z02.1 PRE-EMPLOYMENT DRUG SCREENING: ICD-10-CM

## 2018-10-04 DIAGNOSIS — Z02.1 ENCOUNTER FOR PRE-EMPLOYMENT EXAMINATION: ICD-10-CM

## 2018-10-04 LAB
AMP AMPHETAMINE: NORMAL
BAR BARBITURATES: NORMAL
BZO BENZODIAZEPINES: NORMAL
COC COCAINE: NORMAL
INT CON NEG: NORMAL
INT CON POS: NORMAL
MDMA ECSTASY: NORMAL
MET METHAMPHETAMINES: NORMAL
MTD METHADONE: NORMAL
OPI OPIATES: NORMAL
OXY OXYCODONE: NORMAL
PCP PHENCYCLIDINE: NORMAL
POC URINE DRUG SCREEN OCDRS: NEGATIVE
THC: NORMAL

## 2018-10-04 PROCEDURE — 8915 PR COMPREHENSIVE PHYSICAL: Performed by: PHYSICIAN ASSISTANT

## 2018-10-04 PROCEDURE — 92553 AUDIOMETRY AIR & BONE: CPT | Performed by: PHYSICIAN ASSISTANT

## 2018-10-04 PROCEDURE — 80305 DRUG TEST PRSMV DIR OPT OBS: CPT | Performed by: PHYSICIAN ASSISTANT

## 2019-12-09 ENCOUNTER — OFFICE VISIT (OUTPATIENT)
Dept: URGENT CARE | Facility: PHYSICIAN GROUP | Age: 24
End: 2019-12-09
Payer: COMMERCIAL

## 2019-12-09 VITALS
WEIGHT: 189 LBS | TEMPERATURE: 98.6 F | OXYGEN SATURATION: 99 % | HEIGHT: 62 IN | HEART RATE: 70 BPM | SYSTOLIC BLOOD PRESSURE: 102 MMHG | DIASTOLIC BLOOD PRESSURE: 68 MMHG | BODY MASS INDEX: 34.78 KG/M2

## 2019-12-09 DIAGNOSIS — A09 TRAVELERS' DIARRHEA: ICD-10-CM

## 2019-12-09 DIAGNOSIS — R11.2 NAUSEA AND VOMITING, INTRACTABILITY OF VOMITING NOT SPECIFIED, UNSPECIFIED VOMITING TYPE: ICD-10-CM

## 2019-12-09 LAB
APPEARANCE UR: CLEAR
BILIRUB UR STRIP-MCNC: NORMAL MG/DL
COLOR UR AUTO: YELLOW
GLUCOSE UR STRIP.AUTO-MCNC: NORMAL MG/DL
INT CON NEG: NEGATIVE
INT CON POS: POSITIVE
KETONES UR STRIP.AUTO-MCNC: NORMAL MG/DL
LEUKOCYTE ESTERASE UR QL STRIP.AUTO: NORMAL
NITRITE UR QL STRIP.AUTO: NORMAL
PH UR STRIP.AUTO: 7 [PH] (ref 5–8)
POC URINE PREGNANCY TEST: NORMAL
PROT UR QL STRIP: NORMAL MG/DL
RBC UR QL AUTO: NORMAL
SP GR UR STRIP.AUTO: 1.02
UROBILINOGEN UR STRIP-MCNC: 0.2 MG/DL

## 2019-12-09 PROCEDURE — 81025 URINE PREGNANCY TEST: CPT | Performed by: EMERGENCY MEDICINE

## 2019-12-09 PROCEDURE — 99203 OFFICE O/P NEW LOW 30 MIN: CPT | Performed by: EMERGENCY MEDICINE

## 2019-12-09 PROCEDURE — 81002 URINALYSIS NONAUTO W/O SCOPE: CPT | Performed by: EMERGENCY MEDICINE

## 2019-12-09 RX ORDER — ONDANSETRON 4 MG/1
4 TABLET, FILM COATED ORAL EVERY 8 HOURS PRN
Qty: 6 TAB | Refills: 0 | Status: SHIPPED | OUTPATIENT
Start: 2019-12-09 | End: 2019-12-11

## 2019-12-09 ASSESSMENT — ENCOUNTER SYMPTOMS
ABDOMINAL PAIN: 1
FEVER: 0
VOMITING: 1
FOCAL WEAKNESS: 0
BLOOD IN STOOL: 0
CHANGE IN BOWEL HABIT: 1
NAUSEA: 1
DIARRHEA: 1

## 2019-12-09 NOTE — PROGRESS NOTES
Subjective:      Bette Corey is a 24 y.o. female who presents with Abdominal Pain (x1wks stomach pains diarrhea, nausea vomiting  recent travels to [place])            GI Problem   This is a new problem. The current episode started in the past 7 days. The problem occurs daily. The problem has been unchanged. Associated symptoms include abdominal pain, a change in bowel habit, nausea and vomiting. Pertinent negatives include no fever, rash or urinary symptoms. The symptoms are aggravated by exertion and drinking. She has tried rest and drinking for the symptoms. The treatment provided no relief.   No exposure to diarrheal illness known, no antibiotic use recently.  Onset of symptoms 1 day after travel to [place].    Review of Systems   Constitutional: Negative for fever.   Gastrointestinal: Positive for abdominal pain, change in bowel habit, diarrhea, nausea and vomiting. Negative for blood in stool.        Nausea and vomiting once a day, diarrhea about 8 stools a day   Genitourinary: Negative for dysuria, frequency and urgency.        Denies pregnancy.  LMP this past week   Skin: Negative for rash.   Neurological: Negative for focal weakness.     PMH:  has no past medical history of ASTHMA, CAD (coronary artery disease), Cancer ([facility]), Congestive heart failure (HCC), COPD, Diabetes, Infectious disease, or Stroke ([facility]).  MEDS:   Current Outpatient Medications:   •  riFAXIMin (XIFAXAN) 200 MG Tab, Take 1 Tab by mouth 2 Times a Day for 3 days., Disp: 6 Tab, Rfl: 0  •  ondansetron (ZOFRAN) 4 MG Tab tablet, Take 1 Tab by mouth every 8 hours as needed for Nausea/Vomiting for up to 2 days., Disp: 6 Tab, Rfl: 0  •  albuterol 108 (90 BASE) MCG/ACT Aero Soln inhalation aerosol, Inhale 2 Puffs by mouth every four hours as needed for Shortness of Breath. (Patient not taking: Reported on 12/9/2019), Disp: 1 Inhaler, Rfl: 0  •  hydrocodone-acetaminophen (NORCO) 5-325 MG Tab per tablet, Take 1-2 Tabs by mouth every four  "hours as needed. (Patient not taking: Reported on 12/9/2019), Disp: 10 Tab, Rfl: 0  •  ondansetron (ZOFRAN) 4 MG Tab tablet, Take 1 Tab by mouth every 8 hours as needed for Nausea/Vomiting. (Patient not taking: Reported on 12/9/2019), Disp: 10 Tab, Rfl: 0  ALLERGIES:   Allergies   Allergen Reactions   • Other Food      peanuts     SURGHX:   Past Surgical History:   Procedure Laterality Date   • OTHER      tonsillectomy     SOCHX:  reports that she has never smoked. She has never used smokeless tobacco. She reports that she does not drink alcohol or use drugs.  FH: family history is not on file.   Objective:     /68   Pulse 70   Temp 37 °C (98.6 °F) (Temporal)   Ht 1.575 m (5' 2\")   Wt 85.7 kg (189 lb)   LMP 12/06/2019   SpO2 99%   BMI 34.57 kg/m²      Physical Exam  Constitutional:       General: She is not in acute distress.     Appearance: Normal appearance. She is well-developed. She is not ill-appearing or toxic-appearing.   HENT:      Head: Normocephalic.      Mouth/Throat:      Pharynx: Oropharynx is clear.   Eyes:      General: Lids are normal.      Conjunctiva/sclera: Conjunctivae normal.   Neck:      Vascular: No JVD.      Trachea: Phonation normal.   Cardiovascular:      Rate and Rhythm: Normal rate and regular rhythm.      Heart sounds: Normal heart sounds.   Pulmonary:      Effort: Pulmonary effort is normal.      Breath sounds: Normal breath sounds.   Abdominal:      General: Bowel sounds are normal. There is no distension.      Palpations: Abdomen is soft.      Tenderness: There is generalized tenderness. There is no guarding or rebound.      Hernia: There is no hernia in the umbilical area.   Skin:     General: Skin is warm and dry.   Neurological:      Mental Status: She is alert and oriented to person, place, and time.   Psychiatric:         Behavior: Behavior is cooperative.                 Assessment/Plan:       1. Travelers' diarrhea  - riFAXIMin (XIFAXAN) 200 MG Tab; Take 1 Tab by " mouth 2 Times a Day for 3 days.  Dispense: 6 Tab; Refill: 0  - Complete O&P; Future  - STOOL WBC'S; Future    2. Nausea and vomiting, intractability of vomiting not specified, unspecified vomiting type  Positive blood- POCT Urinalysis  negative- POCT Pregnancy  - ondansetron (ZOFRAN) 4 MG Tab tablet; Take 1 Tab by mouth every 8 hours as needed for Nausea/Vomiting for up to 2 days.  Dispense: 6 Tab; Refill: 0

## 2020-05-15 ENCOUNTER — OFFICE VISIT (OUTPATIENT)
Dept: URGENT CARE | Facility: PHYSICIAN GROUP | Age: 25
End: 2020-05-15
Payer: COMMERCIAL

## 2020-05-15 VITALS
TEMPERATURE: 98.4 F | HEIGHT: 62 IN | WEIGHT: 194 LBS | BODY MASS INDEX: 35.7 KG/M2 | RESPIRATION RATE: 14 BRPM | DIASTOLIC BLOOD PRESSURE: 68 MMHG | SYSTOLIC BLOOD PRESSURE: 104 MMHG | OXYGEN SATURATION: 98 % | HEART RATE: 68 BPM

## 2020-05-15 DIAGNOSIS — J45.20 MILD INTERMITTENT ASTHMA WITHOUT COMPLICATION: ICD-10-CM

## 2020-05-15 PROCEDURE — 99214 OFFICE O/P EST MOD 30 MIN: CPT | Performed by: PHYSICIAN ASSISTANT

## 2020-05-15 RX ORDER — ALBUTEROL SULFATE 90 UG/1
2 AEROSOL, METERED RESPIRATORY (INHALATION) EVERY 6 HOURS PRN
Qty: 8.5 G | Refills: 0 | Status: SHIPPED | OUTPATIENT
Start: 2020-05-15 | End: 2023-09-18

## 2020-05-15 ASSESSMENT — ENCOUNTER SYMPTOMS
RESPIRATORY NEGATIVE: 1
HEADACHES: 0
CHILLS: 0
SHORTNESS OF BREATH: 0
EYES NEGATIVE: 1
GASTROINTESTINAL NEGATIVE: 1
FEVER: 0
COUGH: 0
WHEEZING: 0

## 2020-05-15 NOTE — PROGRESS NOTES
"Subjective:      Bette Aguilar is a 25 y.o. female who presents with Medication Refill (Inhaler)          HPI  The patient is a 25-year-old female with a history of mild intermittent asthma mostly exacerbated by exercise and increased activity.  He reports of mild increase and wheezing and mild coughing during exercise and walking around for the past couple weeks.  She is here for a medication refill on her albuterol inhaler.  She denies any current cough or wheezing or SOB.  Denies any fever, chills, sore throat, runny nose, chest pain or shortness of breath.  She has no other complaints or concerns.  She denies any other pertinent past medical history.      Review of Systems   Constitutional: Negative for chills, fever and malaise/fatigue.   Eyes: Negative.    Respiratory: Negative.  Negative for cough, shortness of breath and wheezing.    Cardiovascular: Negative for chest pain.   Gastrointestinal: Negative.    Skin: Negative.    Neurological: Negative for headaches.          Objective:     /68 (BP Location: Left arm, Patient Position: Sitting, BP Cuff Size: Adult)   Pulse 68   Temp 36.9 °C (98.4 °F) (Temporal)   Resp 14   Ht 1.575 m (5' 2\")   Wt 88 kg (194 lb)   SpO2 98%   BMI 35.48 kg/m²      Physical Exam  Vitals signs reviewed.   Constitutional:       General: She is not in acute distress.     Appearance: Normal appearance. She is not ill-appearing or toxic-appearing.   HENT:      Right Ear: Tympanic membrane normal.      Left Ear: Tympanic membrane normal.      Nose: Nose normal.      Mouth/Throat:      Mouth: Mucous membranes are moist.      Pharynx: Oropharynx is clear. No oropharyngeal exudate or posterior oropharyngeal erythema.   Eyes:      Conjunctiva/sclera: Conjunctivae normal.      Pupils: Pupils are equal, round, and reactive to light.   Cardiovascular:      Rate and Rhythm: Normal rate and regular rhythm.      Heart sounds: Normal heart sounds.   Pulmonary:      Effort: " Pulmonary effort is normal. No respiratory distress.      Breath sounds: Normal breath sounds. No wheezing, rhonchi or rales.   Lymphadenopathy:      Cervical: No cervical adenopathy.   Skin:     General: Skin is warm and dry.      Findings: No rash.   Neurological:      General: No focal deficit present.      Mental Status: She is alert and oriented to person, place, and time.   Psychiatric:         Mood and Affect: Mood normal.         Behavior: Behavior normal.       History reviewed. No pertinent past medical history.   Past Surgical History:   Procedure Laterality Date   • OTHER      tonsillectomy      Social History     Socioeconomic History   • Marital status:      Spouse name: Not on file   • Number of children: Not on file   • Years of education: Not on file   • Highest education level: Not on file   Occupational History   • Not on file   Social Needs   • Financial resource strain: Not on file   • Food insecurity     Worry: Not on file     Inability: Not on file   • Transportation needs     Medical: Not on file     Non-medical: Not on file   Tobacco Use   • Smoking status: Never Smoker   • Smokeless tobacco: Never Used   Substance and Sexual Activity   • Alcohol use: No   • Drug use: No   • Sexual activity: Not on file   Lifestyle   • Physical activity     Days per week: Not on file     Minutes per session: Not on file   • Stress: Not on file   Relationships   • Social connections     Talks on phone: Not on file     Gets together: Not on file     Attends [employer] service: Not on file     Active member of club or organization: Not on file     Attends meetings of clubs or organizations: Not on file     Relationship status: Not on file   • Intimate partner violence     Fear of current or ex partner: Not on file     Emotionally abused: Not on file     Physically abused: Not on file     Forced sexual activity: Not on file   Other Topics Concern   • Not on file   Social History Narrative   • Not on file     Other food            Assessment/Plan:     1. Mild intermittent asthma without complication  - albuterol 108 (90 Base) MCG/ACT Aero Soln inhalation aerosol; Inhale 2 Puffs by mouth every 6 hours as needed for Shortness of Breath.  Dispense: 8.5 g; Refill: 0  - REFERRAL TO FAMILY PRACTICE    Refill on albuterol inhaler.  Discussed follow-up with a primary care physician for further evaluation and management of her asthma.  Patient also asked about weight reduction and stomach band procedure for weight loss.  Discussed this would be more of an appropriate conversation for primary care physician has further recommendations.     Recommended diet control and exercise in the meantime.     Supportive care, differential diagnoses, and indications for immediate follow-up discussed with patient.    Pathogenesis of diagnosis discussed including typical length and natural progression. Patient expresses understanding and agrees to plan.    Please note that this dictation was created using voice recognition software. I have made every reasonable attempt to correct obvious errors, but I expect that there are errors of grammar and possibly content that I did not discover before finalizing the note.

## 2020-06-24 ENCOUNTER — OFFICE VISIT (OUTPATIENT)
Dept: URGENT CARE | Facility: PHYSICIAN GROUP | Age: 25
End: 2020-06-24
Payer: COMMERCIAL

## 2020-06-24 ENCOUNTER — HOSPITAL ENCOUNTER (OUTPATIENT)
Dept: RADIOLOGY | Facility: MEDICAL CENTER | Age: 25
End: 2020-06-24
Attending: PHYSICIAN ASSISTANT
Payer: COMMERCIAL

## 2020-06-24 VITALS
DIASTOLIC BLOOD PRESSURE: 60 MMHG | TEMPERATURE: 97.7 F | HEART RATE: 72 BPM | WEIGHT: 185 LBS | SYSTOLIC BLOOD PRESSURE: 92 MMHG | HEIGHT: 62 IN | RESPIRATION RATE: 16 BRPM | OXYGEN SATURATION: 97 % | BODY MASS INDEX: 34.04 KG/M2

## 2020-06-24 DIAGNOSIS — S86.911A KNEE STRAIN, RIGHT, INITIAL ENCOUNTER: ICD-10-CM

## 2020-06-24 PROCEDURE — 73564 X-RAY EXAM KNEE 4 OR MORE: CPT | Mod: RT

## 2020-06-24 PROCEDURE — 99214 OFFICE O/P EST MOD 30 MIN: CPT | Performed by: PHYSICIAN ASSISTANT

## 2020-06-24 RX ORDER — NAPROXEN 500 MG/1
500 TABLET ORAL 2 TIMES DAILY WITH MEALS
Qty: 60 TAB | Refills: 0 | Status: SHIPPED | OUTPATIENT
Start: 2020-06-24 | End: 2023-09-18

## 2020-06-24 ASSESSMENT — ENCOUNTER SYMPTOMS
SENSORY CHANGE: 0
TINGLING: 0
FEVER: 0
VOMITING: 0
FOCAL WEAKNESS: 0
COUGH: 0
NAUSEA: 0
CHILLS: 0

## 2020-06-24 ASSESSMENT — PAIN SCALES - GENERAL: PAINLEVEL: 7=MODERATE-SEVERE PAIN

## 2020-06-25 NOTE — PROGRESS NOTES
"Subjective:   Bette Aguilar  is a 25 y.o. female who presents for Knee Pain (R knee pain, started popping, x2 days)      Knee Pain   This is a new problem. The current episode started yesterday. Pertinent negatives include no chills, coughing, fever, nausea, rash or vomiting.   Patient comes clinic planing last 2 days pain right knee.  She notes pain with running is progressively worsened.  He states she has been working on dieting as well as a new jogging program and has had increasing knee pain with this.  Initially was mildly limiting and bothered her more after her dogs while she was in a seated position more recently into become more painful with sensation of swelling and fullness bothered her during rounds.  She complains of a popping sensation around the kneecap that is painful while running as well.  She denies history of knee surgeries.  She denies treatments tried.  She notes she also plays soccer denies any historical injuries associated.  She denies instability.  She denies catching or locking.  She complains of pain    Review of Systems   Constitutional: Negative for chills and fever.   Respiratory: Negative for cough.    Gastrointestinal: Negative for nausea and vomiting.   Musculoskeletal: Positive for joint pain ( right knee).   Skin: Negative for rash.   Neurological: Negative for tingling, sensory change and focal weakness.       Allergies   Allergen Reactions   • Other Food      peanuts        Objective:   BP (!) 92/60   Pulse 72   Temp 36.5 °C (97.7 °F) (Temporal)   Resp 16   Ht 1.575 m (5' 2\")   Wt 83.9 kg (185 lb)   SpO2 97%   BMI 33.84 kg/m²     Physical Exam  Vitals signs and nursing note reviewed.   Constitutional:       General: She is not in acute distress.     Appearance: She is well-developed. She is not diaphoretic.   HENT:      Head: Normocephalic and atraumatic.      Right Ear: External ear normal.      Left Ear: External ear normal.      Nose: Nose normal.   Eyes:      " General: Lids are normal. No scleral icterus.        Right eye: No discharge.         Left eye: No discharge.      Conjunctiva/sclera: Conjunctivae normal.   Neck:      Musculoskeletal: Neck supple.   Pulmonary:      Effort: Pulmonary effort is normal. No respiratory distress.   Musculoskeletal: Normal range of motion.      Comments: Trace to mild effusion, no erythema or ecchymosis, pain with forced flexion forced extension, range of motion near full but limited secondary to complaints of pain, lateral joint line tenderness, positive Trinity special test, pain with squat, negative Lockman's   Skin:     General: Skin is warm and dry.      Coloration: Skin is not pale.      Findings: No erythema.   Neurological:      Mental Status: She is alert and oriented to person, place, and time. She is not disoriented.   Psychiatric:         Speech: Speech normal.         Behavior: Behavior normal.     dx knee -review of knee radiographs by myself demonstrates no joint space narrowing throughout, slight lateral tracking of patella, no acute fracture    Xray: no fracture or dislocation per radiology    Assessment/Plan:   1. Knee strain, right, initial encounter  - DX-KNEE COMPLETE 4+ RIGHT; Future  - REFERRAL TO SPORTS MEDICINE  - naproxen (NAPROSYN) 500 MG Tab; Take 1 Tab by mouth 2 times a day, with meals.  Dispense: 60 Tab; Refill: 0  Recommend conservative care, rest, ice, elevation, work on gentle ROM exercises, wall slides taught, suspect some patellofemoral syndrome with concern for meniscus pathology, sent with crutches, cautioned to avoid additional NSAIDs with naproxen  Return to clinic with lack of resolution or progression of symptoms.      I have worn an N95 mask, gloves and eye protection for the entire encounter with this patient.     Differential diagnosis, natural history, supportive care, and indications for immediate follow-up discussed.

## 2020-06-30 ENCOUNTER — OFFICE VISIT (OUTPATIENT)
Dept: MEDICAL GROUP | Facility: CLINIC | Age: 25
End: 2020-06-30
Payer: COMMERCIAL

## 2020-06-30 VITALS
HEIGHT: 62 IN | TEMPERATURE: 98.3 F | RESPIRATION RATE: 16 BRPM | BODY MASS INDEX: 34.04 KG/M2 | OXYGEN SATURATION: 96 % | HEART RATE: 86 BPM | WEIGHT: 185 LBS | SYSTOLIC BLOOD PRESSURE: 118 MMHG | DIASTOLIC BLOOD PRESSURE: 72 MMHG

## 2020-06-30 DIAGNOSIS — M76.31 IT BAND SYNDROME, RIGHT: ICD-10-CM

## 2020-06-30 DIAGNOSIS — M25.861 PATELLOFEMORAL DYSFUNCTION, RIGHT: ICD-10-CM

## 2020-06-30 PROCEDURE — 99203 OFFICE O/P NEW LOW 30 MIN: CPT | Performed by: FAMILY MEDICINE

## 2020-06-30 NOTE — LETTER
June 30, 2020         Patient: Bette Aguilar   YOB: 1995   Date of Visit: 6/30/2020           To Whom it May Concern:    Bette Aguilar was seen in my clinic on 6/30/2020. She may be excused for the duration of her appointment.     If you have any questions or concerns, please don't hesitate to call.        Sincerely,           Shaheen Minaya M.D.  Electronically Signed

## 2020-06-30 NOTE — PROGRESS NOTES
CHIEF COMPLAINT:  Bette Aguilar female presenting at the request of Bartolome Esquivel PA-C for evaluation of knee pain.     Bette Aguilar is complaining of right knee pain    Insidious onset with running, [place]  Pain is at the anterior knee  Quality is pressure  Pain is non-radiating   Improved with rest/ not running as much  Aggravated by running  no prior problems with this area in the past   Prior Treatments: seen at   Prior studies: X-Ray   Medications tried for pain include: acetaminophen, ibuprofen (OTC) without much improvement   Mechanical Symptom history: No Locking and Popping which can feel painful/pressure    , warehouse, desk work  Likes running, soccer    REVIEW OF SYSTEMS  No Nausea, No Vomiting, No Chest Pain, No Shortness of Breath, No Dizziness, No Headache    PAST MEDICAL HISTORY:   History reviewed. No pertinent past medical history.    PMH:  has no past medical history of ASTHMA, CAD (coronary artery disease), Cancer ([facility]), Congestive heart failure ([facility]), COPD, Diabetes, Infectious disease, or Stroke ([facility]).  MEDS:   Current Outpatient Medications:   •  naproxen (NAPROSYN) 500 MG Tab, Take 1 Tab by mouth 2 times a day, with meals., Disp: 60 Tab, Rfl: 0  •  albuterol 108 (90 Base) MCG/ACT Aero Soln inhalation aerosol, Inhale 2 Puffs by mouth every 6 hours as needed for Shortness of Breath. (Patient not taking: Reported on 6/24/2020), Disp: 8.5 g, Rfl: 0  •  albuterol 108 (90 BASE) MCG/ACT Aero Soln inhalation aerosol, Inhale 2 Puffs by mouth every four hours as needed for Shortness of Breath. (Patient not taking: Reported on 6/24/2020), Disp: 1 Inhaler, Rfl: 0  •  hydrocodone-acetaminophen (NORCO) 5-325 MG Tab per tablet, Take 1-2 Tabs by mouth every four hours as needed. (Patient not taking: Reported on 12/9/2019), Disp: 10 Tab, Rfl: 0  •  ondansetron (ZOFRAN) 4 MG Tab tablet, Take 1 Tab by mouth every 8 hours as needed for Nausea/Vomiting. (Patient not taking: Reported  "on 12/9/2019), Disp: 10 Tab, Rfl: 0  ALLERGIES:   Allergies   Allergen Reactions   • Other Food      peanuts     SURGHX:   Past Surgical History:   Procedure Laterality Date   • OTHER      tonsillectomy     SOCHX:  reports that she has never smoked. She has never used smokeless tobacco. She reports that she does not drink alcohol or use drugs.  FH: Family history was reviewed, no pertinent findings to report     PHYSICAL EXAM:  /72 (BP Location: Left arm, Patient Position: Sitting, BP Cuff Size: Adult)   Pulse 86   Temp 36.8 °C (98.3 °F) (Temporal)   Resp 16   Ht 1.575 m (5' 2\")   Wt 83.9 kg (185 lb)   SpO2 96%   BMI 33.84 kg/m²      obese in no apparent distress, alert and oriented x 3.  Gait: normal     RIGHT Knee:  Slight Varus and No Swelling  Range of Motion Slightly limited with Flexion and Pain at extremes of motion  1+ effusion  Patellar Medial facet tenderness and Apprehension  Medial Joint Line Tenderness and POSITIVE Trinity  Lateral Joint Line Non-tender and NEGATIVE Trinity  Trace Laxity with Varus stress  Trace Laxity with Valgus stress  Lachman's testing is Trace  Posterior Drawer Testing is Trace  The leg is otherwise neurovascularly intact    LEFT Knee:  Slight Varus and No Swelling   Range of Motion Intact  Trace effusion  Patellar No tenderness and no apprehension  Medial Joint Line Non-tender and NEGATIVE Trinity  Lateral Joint Line Non-tender and NEGATIVE Trinity  Trace Laxity with Varus stress  Trace Laxity with Valgus stress  Lachman's testing is Trace  Posterior Drawer Testing is Trace  The leg is otherwise neurovascularly intact    Additional Findings: Tight hamstrings and tenderness along the RIGHT lateral distal ITB      1. Patellofemoral dysfunction, right  REFERRAL TO PHYSICAL THERAPY Reason for Therapy: Eval/Treat/Report   2. It band syndrome, right (DISTAL)  REFERRAL TO PHYSICAL THERAPY Reason for Therapy: Eval/Treat/Report     HEP provided  Patellofemoral stabilizer " brace fitted in the office which HELPED  [facility] [place] location ordered    She has gained a significant amount of weight since being out of soccer due to the coronavirus pandemic  She would like to get back to soccer    Return in about 4 weeks (around 7/28/2020).   See how she is doing with her home exercise program, brace and formal physical therapy        6/24/2020 6:46 PM     HISTORY/REASON FOR EXAM:  Right knee pain        TECHNIQUE/EXAM DESCRIPTION AND NUMBER OF VIEWS:  4 views of the RIGHT knee.     COMPARISON: None     FINDINGS:  Bone density is normal.  There is no evidence of fracture or dislocation.  There is no evidence of arthropathy.  There is no joint effusion.     IMPRESSION:     No evidence of fracture or dislocation.    done elsewhere and reviewed independently by me    Thank you Bartolome Esquivel PA-C for allowing me to participate in caring for your patient.

## 2020-07-28 ENCOUNTER — PHYSICAL THERAPY (OUTPATIENT)
Dept: PHYSICAL THERAPY | Facility: REHABILITATION | Age: 25
End: 2020-07-28
Attending: FAMILY MEDICINE
Payer: COMMERCIAL

## 2020-07-28 DIAGNOSIS — M25.861 PATELLOFEMORAL DYSFUNCTION, RIGHT: ICD-10-CM

## 2020-07-28 DIAGNOSIS — M76.31 IT BAND SYNDROME, RIGHT: ICD-10-CM

## 2020-07-28 PROCEDURE — 97110 THERAPEUTIC EXERCISES: CPT

## 2020-07-28 PROCEDURE — 97161 PT EVAL LOW COMPLEX 20 MIN: CPT

## 2020-07-28 NOTE — OP THERAPY EVALUATION
Outpatient Physical Therapy  INITIAL EVALUATION    [facility] [place]  [address], [address]  [place] [address]  Phone:  [phone]  Fax:  [phone]    Date of Evaluation: 2020    Patient: Bette Aguilar  YOB: 1995  MRN: [id number]     Referring Provider: Shaheen Minaya M.D.  [address] [place] [address] [place] [address]   Referring Diagnosis Patellofemoral dysfunction, right [M25.861];It band syndrome, right [M76.31]     Time Calculation    Start time: 0415  Stop time: 0500 Time Calculation (min): 45 minutes     Chief Complaint: knee popping    Visit Diagnoses     ICD-10-CM   1. Patellofemoral dysfunction, right  M25.861   2. It band syndrome, right  M76.31       Subjective:   History of Present Illness:     Mechanism of injury:  Bette Aguilar is a 25 y.o. female that presents to therapy with R knee pain for about a month. Reports that she hasn't had pain lately and is more concerned of the popping she feels in her knee. She states that symptoms came on with sudden onset when running around the [place] about a month ago. She reports she is overly not concerned and reports that she feels she doesn't need therapy. Reports no pain currently  Patient denies fevers/chills, numbness/weakness of lower extremities, bowel/bladder incontinence, saddle anesthesia.     Aggravating factors: running, stairs (before)   Relieving factors: rest    ADL limitations: none, can run without pain lately.     Pain:     Current pain ratin      No past medical history on file.  Past Surgical History:   Procedure Laterality Date   • OTHER      tonsillectomy     Social History     Tobacco Use   • Smoking status: Never Smoker   • Smokeless tobacco: Never Used   Substance Use Topics   • Alcohol use: No     Family and Occupational History     Socioeconomic History   • Marital status:      Spouse name: Not on file   • Number of children: Not on file   • Years of education: Not  on file   • Highest education level: Not on file   Occupational History   • Not on file       Objective     Passive Range of Motion   Left Knee   Normal passive range of motion    Right Knee   Normal passive range of motion    Patellar Static Positioning   Right Knee: WFL    Patellar Mobility     Right Knee Patellar tendons within functional limits include the medial, lateral, superior and inferior.     Strength:      Right Hip   Planes of Motion   Flexion: 5  Extension: 5  Abduction: 4+  Adduction: 5  External rotation: 5  Internal rotation: 4+    Right Knee   Flexion: 5  Prone flexion: 5  Extension: 5  Quadriceps contraction: good    Tests     Right Knee   Negative lateral Trinity, medial Trinity, patellar apprehension, patellar compression, patella-femoral grind, Thessaly's test at 5 degrees and Thessaly's test at 20 degrees.         Therapeutic Exercises (CPT 03170):       Therapeutic Exercise Summary: HEP instruction/performance and development. Handout provided and exercises located below:  Access Code: [email or web address]   URL: [email or web address]   Date: 07/28/2020   Prepared by: Tao Cordero     Exercises  Supine Bridge - 20 reps - 2 sets - 1x daily  Sidelying Diagonal Hip Abduction - 20 reps - 2 sets - 1x daily  Single Leg Squat with Chair Touch - 8 reps - 3 sets - 1x daily  Backward Step Down - 15 reps - 2 sets - 1x daily      Time-based treatments/modalities:    Physical Therapy Timed Treatment Charges  Therapeutic exercise minutes (CPT 40548): 15 minutes      Assessment, Response and Plan:   Assessment details:  Bette Aguilar is a 25 y.o. female with signs and symptoms consistent with previous patella femoral pain syndrome. Pt was instructed exercises for continue hip strengthening and given general recommendations for running exercise progressions. She has been given the opportunity to f/u with this clinic within 30 days. At this time she has no scheduled follow ups and does not plan to follow up  with her physician.   Goals:   Short Term Goals:   1. Patient will be Independent with prescribed Home Exercise Program (HEP) and will be able to demonstrate exercises without cues for improved overall symptoms/activity tolerance.   2. Pt will improve hip strength to 5/5  Short term goal time span:  1-2 weeks      Long Term Goals:    3. Pt to f/u within 30 days if symptoms return.   Long term goal time span:  2-4 weeks    Plan:   Planned therapy interventions:  Therapeutic Exercise (CPT 29344), Therapeutic Activities (CPT 13081), Manual Therapy (CPT 74633), Neuromuscular Re-education (CPT 69495), E Stim Unattended (CPT 39337) and Gait Training (CPT 24838)  Frequency:  1x month  Duration in visits:  1  Discussed with:  Patient      Functional Assessment Used  PT Functional Assessment Tool Used: LEFS  PT Functional Assessment Score: 80/80     Referring provider co-signature:  I have reviewed this plan of care and my co-signature certifies the need for services.    Certification Period: 07/28/2020 to  08/26/20    Physician Signature: ________________________________ Date: ______________

## 2020-07-29 ASSESSMENT — ENCOUNTER SYMPTOMS: PAIN SCALE: 0

## 2020-07-30 ENCOUNTER — APPOINTMENT (OUTPATIENT)
Dept: PHYSICAL THERAPY | Facility: REHABILITATION | Age: 25
End: 2020-07-30
Attending: FAMILY MEDICINE
Payer: COMMERCIAL

## 2020-08-04 ENCOUNTER — APPOINTMENT (OUTPATIENT)
Dept: PHYSICAL THERAPY | Facility: REHABILITATION | Age: 25
End: 2020-08-04
Attending: FAMILY MEDICINE
Payer: COMMERCIAL

## 2020-08-06 ENCOUNTER — APPOINTMENT (OUTPATIENT)
Dept: PHYSICAL THERAPY | Facility: REHABILITATION | Age: 25
End: 2020-08-06
Attending: FAMILY MEDICINE
Payer: COMMERCIAL

## 2020-08-11 ENCOUNTER — APPOINTMENT (OUTPATIENT)
Dept: PHYSICAL THERAPY | Facility: REHABILITATION | Age: 25
End: 2020-08-11
Attending: FAMILY MEDICINE
Payer: COMMERCIAL

## 2020-08-13 ENCOUNTER — APPOINTMENT (OUTPATIENT)
Dept: PHYSICAL THERAPY | Facility: REHABILITATION | Age: 25
End: 2020-08-13
Attending: FAMILY MEDICINE
Payer: COMMERCIAL

## 2020-08-18 ENCOUNTER — APPOINTMENT (OUTPATIENT)
Dept: PHYSICAL THERAPY | Facility: REHABILITATION | Age: 25
End: 2020-08-18
Attending: FAMILY MEDICINE
Payer: COMMERCIAL

## 2020-08-20 ENCOUNTER — APPOINTMENT (OUTPATIENT)
Dept: PHYSICAL THERAPY | Facility: REHABILITATION | Age: 25
End: 2020-08-20
Attending: FAMILY MEDICINE
Payer: COMMERCIAL

## 2020-09-24 ENCOUNTER — TELEPHONE (OUTPATIENT)
Dept: PHYSICAL THERAPY | Facility: REHABILITATION | Age: 25
End: 2020-09-24

## 2020-09-24 NOTE — OP THERAPY DISCHARGE SUMMARY
Outpatient Physical Therapy  DISCHARGE SUMMARY NOTE      [facility] [place]  [address], [address]  [place] [address]  Phone:  [phone]  Fax:  [phone]    Date of Visit: 09/24/2020    Patient: Bette Aguilar  YOB: 1995  MRN: [id number]     Referring Provider: Shaheen Minaya M.D.   Referring Diagnosis Patellofemoral dysfunction, right [M25.861];It band syndrome, right [M76.31]         Functional Assessment Used        Your patient is being discharged from Physical Therapy with the following comments:   · Patient has failed to schedule or reschedule follow-up visits    Comments:  Bette Aguilar has been discharged due to a lapse in care greater than 30 days. Thank you for the opportunity to assist you and your patient.       Limitations Remaining:  none  Recommendations:  F/u with PCP as needed    Tao Cordero, PT, DPT    Date: 9/24/2020

## 2022-01-11 ENCOUNTER — HOSPITAL ENCOUNTER (OUTPATIENT)
Facility: MEDICAL CENTER | Age: 27
End: 2022-01-11
Attending: OBSTETRICS & GYNECOLOGY | Admitting: OBSTETRICS & GYNECOLOGY
Payer: COMMERCIAL

## 2022-01-20 ENCOUNTER — APPOINTMENT (OUTPATIENT)
Dept: ADMISSIONS | Facility: MEDICAL CENTER | Age: 27
End: 2022-01-20
Payer: COMMERCIAL

## 2023-09-17 SDOH — ECONOMIC STABILITY: FOOD INSECURITY: WITHIN THE PAST 12 MONTHS, YOU WORRIED THAT YOUR FOOD WOULD RUN OUT BEFORE YOU GOT MONEY TO BUY MORE.: NEVER TRUE

## 2023-09-17 SDOH — ECONOMIC STABILITY: HOUSING INSECURITY
IN THE LAST 12 MONTHS, WAS THERE A TIME WHEN YOU DID NOT HAVE A STEADY PLACE TO SLEEP OR SLEPT IN A SHELTER (INCLUDING NOW)?: NO

## 2023-09-17 SDOH — ECONOMIC STABILITY: FOOD INSECURITY: WITHIN THE PAST 12 MONTHS, THE FOOD YOU BOUGHT JUST DIDN'T LAST AND YOU DIDN'T HAVE MONEY TO GET MORE.: NEVER TRUE

## 2023-09-17 SDOH — ECONOMIC STABILITY: TRANSPORTATION INSECURITY
IN THE PAST 12 MONTHS, HAS LACK OF TRANSPORTATION KEPT YOU FROM MEETINGS, WORK, OR FROM GETTING THINGS NEEDED FOR DAILY LIVING?: NO

## 2023-09-17 SDOH — ECONOMIC STABILITY: TRANSPORTATION INSECURITY
IN THE PAST 12 MONTHS, HAS LACK OF RELIABLE TRANSPORTATION KEPT YOU FROM MEDICAL APPOINTMENTS, MEETINGS, WORK OR FROM GETTING THINGS NEEDED FOR DAILY LIVING?: NO

## 2023-09-17 SDOH — HEALTH STABILITY: PHYSICAL HEALTH: ON AVERAGE, HOW MANY DAYS PER WEEK DO YOU ENGAGE IN MODERATE TO STRENUOUS EXERCISE (LIKE A BRISK WALK)?: 5 DAYS

## 2023-09-17 SDOH — ECONOMIC STABILITY: TRANSPORTATION INSECURITY
IN THE PAST 12 MONTHS, HAS THE LACK OF TRANSPORTATION KEPT YOU FROM MEDICAL APPOINTMENTS OR FROM GETTING MEDICATIONS?: NO

## 2023-09-17 SDOH — ECONOMIC STABILITY: INCOME INSECURITY: HOW HARD IS IT FOR YOU TO PAY FOR THE VERY BASICS LIKE FOOD, HOUSING, MEDICAL CARE, AND HEATING?: NOT HARD AT ALL

## 2023-09-17 SDOH — HEALTH STABILITY: MENTAL HEALTH
STRESS IS WHEN SOMEONE FEELS TENSE, NERVOUS, ANXIOUS, OR CAN'T SLEEP AT NIGHT BECAUSE THEIR MIND IS TROUBLED. HOW STRESSED ARE YOU?: NOT AT ALL

## 2023-09-17 SDOH — ECONOMIC STABILITY: HOUSING INSECURITY: IN THE LAST 12 MONTHS, HOW MANY PLACES HAVE YOU LIVED?: 2

## 2023-09-17 SDOH — HEALTH STABILITY: PHYSICAL HEALTH: ON AVERAGE, HOW MANY MINUTES DO YOU ENGAGE IN EXERCISE AT THIS LEVEL?: 20 MIN

## 2023-09-17 SDOH — ECONOMIC STABILITY: INCOME INSECURITY: IN THE LAST 12 MONTHS, WAS THERE A TIME WHEN YOU WERE NOT ABLE TO PAY THE MORTGAGE OR RENT ON TIME?: NO

## 2023-09-17 ASSESSMENT — SOCIAL DETERMINANTS OF HEALTH (SDOH)
DO YOU BELONG TO ANY CLUBS OR ORGANIZATIONS SUCH AS CHURCH GROUPS UNIONS, FRATERNAL OR ATHLETIC GROUPS, OR SCHOOL GROUPS?: YES
HOW OFTEN DO YOU HAVE A DRINK CONTAINING ALCOHOL: NEVER
IN A TYPICAL WEEK, HOW MANY TIMES DO YOU TALK ON THE PHONE WITH FAMILY, FRIENDS, OR NEIGHBORS?: MORE THAN THREE TIMES A WEEK
HOW OFTEN DO YOU ATTEND CHURCH OR RELIGIOUS SERVICES?: MORE THAN 4 TIMES PER YEAR
WITHIN THE PAST 12 MONTHS, YOU WORRIED THAT YOUR FOOD WOULD RUN OUT BEFORE YOU GOT THE MONEY TO BUY MORE: NEVER TRUE
HOW OFTEN DO YOU ATTENT MEETINGS OF THE CLUB OR ORGANIZATION YOU BELONG TO?: MORE THAN 4 TIMES PER YEAR
HOW OFTEN DO YOU ATTENT MEETINGS OF THE CLUB OR ORGANIZATION YOU BELONG TO?: MORE THAN 4 TIMES PER YEAR
HOW MANY DRINKS CONTAINING ALCOHOL DO YOU HAVE ON A TYPICAL DAY WHEN YOU ARE DRINKING: PATIENT DOES NOT DRINK
HOW OFTEN DO YOU ATTEND CHURCH OR RELIGIOUS SERVICES?: MORE THAN 4 TIMES PER YEAR
HOW OFTEN DO YOU GET TOGETHER WITH FRIENDS OR RELATIVES?: MORE THAN THREE TIMES A WEEK
HOW HARD IS IT FOR YOU TO PAY FOR THE VERY BASICS LIKE FOOD, HOUSING, MEDICAL CARE, AND HEATING?: NOT HARD AT ALL
IN A TYPICAL WEEK, HOW MANY TIMES DO YOU TALK ON THE PHONE WITH FAMILY, FRIENDS, OR NEIGHBORS?: MORE THAN THREE TIMES A WEEK
DO YOU BELONG TO ANY CLUBS OR ORGANIZATIONS SUCH AS CHURCH GROUPS UNIONS, FRATERNAL OR ATHLETIC GROUPS, OR SCHOOL GROUPS?: YES
HOW OFTEN DO YOU GET TOGETHER WITH FRIENDS OR RELATIVES?: MORE THAN THREE TIMES A WEEK
HOW OFTEN DO YOU HAVE SIX OR MORE DRINKS ON ONE OCCASION: NEVER

## 2023-09-17 ASSESSMENT — LIFESTYLE VARIABLES
SKIP TO QUESTIONS 9-10: 1
HOW OFTEN DO YOU HAVE A DRINK CONTAINING ALCOHOL: NEVER
AUDIT-C TOTAL SCORE: 0
HOW MANY STANDARD DRINKS CONTAINING ALCOHOL DO YOU HAVE ON A TYPICAL DAY: PATIENT DOES NOT DRINK
HOW OFTEN DO YOU HAVE SIX OR MORE DRINKS ON ONE OCCASION: NEVER

## 2023-09-18 ENCOUNTER — OFFICE VISIT (OUTPATIENT)
Dept: INTERNAL MEDICINE | Facility: OTHER | Age: 28
End: 2023-09-18
Payer: COMMERCIAL

## 2023-09-18 VITALS
HEART RATE: 83 BPM | HEIGHT: 62 IN | DIASTOLIC BLOOD PRESSURE: 64 MMHG | WEIGHT: 178 LBS | TEMPERATURE: 97.4 F | OXYGEN SATURATION: 98 % | BODY MASS INDEX: 32.76 KG/M2 | SYSTOLIC BLOOD PRESSURE: 116 MMHG

## 2023-09-18 DIAGNOSIS — Z01.818 TUBAL LIGATION EVALUATION: ICD-10-CM

## 2023-09-18 DIAGNOSIS — E66.9 OBESITY (BMI 30-39.9): ICD-10-CM

## 2023-09-18 DIAGNOSIS — Z30.8 ENCOUNTER FOR OTHER CONTRACEPTIVE MANAGEMENT: ICD-10-CM

## 2023-09-18 DIAGNOSIS — F33.40 RECURRENT MAJOR DEPRESSIVE DISORDER, IN REMISSION (HCC): ICD-10-CM

## 2023-09-18 DIAGNOSIS — J45.20 MILD INTERMITTENT ASTHMA WITHOUT COMPLICATION: ICD-10-CM

## 2023-09-18 PROCEDURE — 99204 OFFICE O/P NEW MOD 45 MIN: CPT | Mod: GC

## 2023-09-18 PROCEDURE — 3078F DIAST BP <80 MM HG: CPT | Mod: GC

## 2023-09-18 PROCEDURE — 3074F SYST BP LT 130 MM HG: CPT | Mod: GC

## 2023-09-18 RX ORDER — ETONOGESTREL AND ETHINYL ESTRADIOL VAGINAL RING .015; .12 MG/D; MG/D
1 RING VAGINAL
COMMUNITY
Start: 2023-09-01

## 2023-09-18 RX ORDER — BUPROPION HYDROCHLORIDE 300 MG/1
300 TABLET ORAL EVERY MORNING
COMMUNITY
Start: 2023-09-03 | End: 2023-12-12

## 2023-09-18 ASSESSMENT — PATIENT HEALTH QUESTIONNAIRE - PHQ9: CLINICAL INTERPRETATION OF PHQ2 SCORE: 0

## 2023-09-18 NOTE — PROGRESS NOTES
Date of Service:  2023    CC: Establish care, discuss tubal ligation    HPI:  Bette Aguilar  is a 28 y.o. female with a medical history including recurrent major depressive disorder, mild intermittent asthma and obesity.Patient presents to discuss tubal ligation and to establish care.  Patient has been seen by a different office in the recent past. She was last seen two months ago. She had been following for a weight loss program. She was started on Wellbutrin and started to lose weight. She went from 230 lbs to 178 lbs. She had been initially placed on it when she was after her pregnancy at 15, and then began to lose weight.     Patient had asthma as a child, uses inhaler infrequently. Patient's last pap smear was at the beginning of the year, nothing abnormal was noted at the time. Patient has had two children, ages 9 and 13. Underwent a  8 years ago because of size of her son. Patient has not had regular periods. During the beginning of the month her periods are very heavy and painful. Usually they last for six days. She has not had any lab work since November, she was concerned for menopause because she was sweaty.     Patient would like to have a tubal ligation or hysterectomy. She feels that her periods are very painful and would like to stop having this pain. She is currently using a Nuva ring.       Review of systems:  Review of Systems   Constitutional:  Positive for weight loss (Intentional). Negative for chills and fever.   HENT:          Cheek pain   Eyes: Negative.    Cardiovascular: Negative.    Gastrointestinal:  Positive for abdominal pain (Cramping with periods).   Neurological: Negative.         Past Medical History:  Patient Active Problem List    Diagnosis Date Noted    Encounter for other contraceptive management 2023    Mild intermittent asthma without complication 2023    Recurrent major depressive disorder, in remission ([facility]) 2023    Obesity (BMI 30-39.9)  09/18/2023       Past Surgical History:    has a past surgical history that includes other.    Medications:  Current Outpatient Medications   Medication Sig Dispense Refill    buPROPion (WELLBUTRIN XL) 300 MG XL tablet Take 300 mg by mouth every morning.      ethinyl estradiol-etonogestrel (NUVARING) 0.12-0.015 MG/24HR vaginal ring INSERT 1 RING VAGINALLY FOR 3 WEEKS, THEN REMOVE FOR 1 WEEK       No current facility-administered medications for this visit.       Allergies:  Allergies   Allergen Reactions    Other Food      peanuts       Family History:   family history is not on file.     Mother has diabetes Type 2, grandfather (liver cancer) M      Social History:    Social History     Tobacco Use    Smoking status: Never    Smokeless tobacco: Never   Vaping Use    Vaping Use: Never used   Substance Use Topics    Alcohol use: No    Drug use: No   Alcohol three times per year        Employment: Work at [employer], storage place   Activity Level: Very active, walks all day  Living situation:  Lives with  and two kids   Recent travel:  no recent travel   Other (stressors, spirituality, exposures):  n/a    Physical Exam:  Vitals:    09/18/23 1534   BP: 116/64   Pulse: 83   Temp: 36.3 °C (97.4 °F)   SpO2: 98%     Body mass index is 32.87 kg/m².  Physical Exam  Constitutional:       General: She is not in acute distress.     Appearance: She is not toxic-appearing.   HENT:      Mouth/Throat:      Mouth: Mucous membranes are dry.      Pharynx: No oropharyngeal exudate or posterior oropharyngeal erythema.   Eyes:      Extraocular Movements: Extraocular movements intact.      Pupils: Pupils are equal, round, and reactive to light.   Cardiovascular:      Rate and Rhythm: Regular rhythm. Tachycardia present.   Pulmonary:      Effort: Pulmonary effort is normal. No respiratory distress.      Breath sounds: Normal breath sounds. No wheezing or rales.   Abdominal:      General: Abdomen is flat. Bowel sounds are normal. There is  no distension.      Tenderness: There is no abdominal tenderness. There is no guarding.   Musculoskeletal:         General: Normal range of motion.      Right lower leg: No edema.      Left lower leg: No edema.   Skin:     General: Skin is warm and dry.   Neurological:      General: No focal deficit present.      Mental Status: She is alert and oriented to person, place, and time.          Labs:  none    Imaging:  none    Assessment/Plan:  Obesity (BMI 30-39.9)  Patient has been losing weight after starting Wellbutrin. Unsure if this is related to medication itself vs. Caloric intake/exercise. Patient does not have a history of any diabetes or other metabolic disorders.     Plan:  -CMP  -Hemoglobin A1c  -Lipid panel      Recurrent major depressive disorder, in remission (HCC)  Patient has had post partum depression after first pregnancy at 15. Improvement and weight loss after taking Wellbutrin. No signs of depression at this time. Continues to take Wellbutrin.     Encounter for other contraceptive management  Patient desires to have a tubal ligation. Patient has had two children and does not plan on having more. Currently is using Nuva ring. Would like to discuss options as well as hysterectomy because of painful periods.     Plan:  -Continue Nuva ring at this time  -Referral to OBGYN            All imaging results and lab results and consult notes are reviewed at this visit.  Followup: Return in about 3 months (around 12/18/2023).    Uyen Samaniego MD  Internal Medicine PGY-3

## 2023-09-20 PROBLEM — Z30.8 ENCOUNTER FOR OTHER CONTRACEPTIVE MANAGEMENT: Status: ACTIVE | Noted: 2023-09-20

## 2023-09-20 ASSESSMENT — ENCOUNTER SYMPTOMS
WEIGHT LOSS: 1
NEUROLOGICAL NEGATIVE: 1
ABDOMINAL PAIN: 1
FEVER: 0
CHILLS: 0
EYES NEGATIVE: 1
CARDIOVASCULAR NEGATIVE: 1

## 2023-09-21 NOTE — ASSESSMENT & PLAN NOTE
Patient desires to have a tubal ligation. Patient has had two children and does not plan on having more. Currently is using Nuva ring. Would like to discuss options as well as hysterectomy because of painful periods.     Plan:  -Continue Nuva ring at this time  -Referral to OBGYN

## 2023-09-21 NOTE — ASSESSMENT & PLAN NOTE
Patient has been losing weight after starting Wellbutrin. Unsure if this is related to medication itself vs. Caloric intake/exercise. Patient does not have a history of any diabetes or other metabolic disorders.     Plan:  -CMP  -Hemoglobin A1c  -Lipid panel

## 2023-11-17 ENCOUNTER — GYNECOLOGY VISIT (OUTPATIENT)
Dept: OBGYN | Facility: CLINIC | Age: 28
End: 2023-11-17
Payer: COMMERCIAL

## 2023-11-17 ENCOUNTER — HOSPITAL ENCOUNTER (OUTPATIENT)
Facility: MEDICAL CENTER | Age: 28
End: 2023-11-17
Attending: OBSTETRICS & GYNECOLOGY
Payer: COMMERCIAL

## 2023-11-17 VITALS
DIASTOLIC BLOOD PRESSURE: 78 MMHG | SYSTOLIC BLOOD PRESSURE: 119 MMHG | WEIGHT: 194 LBS | BODY MASS INDEX: 35.7 KG/M2 | HEIGHT: 62 IN

## 2023-11-17 DIAGNOSIS — Z01.419 WOMEN'S ANNUAL ROUTINE GYNECOLOGICAL EXAMINATION: ICD-10-CM

## 2023-11-17 DIAGNOSIS — Z01.419 WOMEN'S ANNUAL ROUTINE GYNECOLOGICAL EXAMINATION: Primary | ICD-10-CM

## 2023-11-17 PROCEDURE — 87624 HPV HI-RISK TYP POOLED RSLT: CPT

## 2023-11-17 PROCEDURE — 99385 PREV VISIT NEW AGE 18-39: CPT | Performed by: OBSTETRICS & GYNECOLOGY

## 2023-11-17 PROCEDURE — 3078F DIAST BP <80 MM HG: CPT | Performed by: OBSTETRICS & GYNECOLOGY

## 2023-11-17 PROCEDURE — 88175 CYTOPATH C/V AUTO FLUID REDO: CPT

## 2023-11-17 PROCEDURE — 3074F SYST BP LT 130 MM HG: CPT | Performed by: OBSTETRICS & GYNECOLOGY

## 2023-11-17 RX ORDER — ETONOGESTREL AND ETHINYL ESTRADIOL VAGINAL RING .015; .12 MG/D; MG/D
1 RING VAGINAL
Qty: 3 EACH | Refills: 3 | Status: SHIPPED | OUTPATIENT
Start: 2023-11-17 | End: 2023-12-12

## 2023-11-17 NOTE — PROGRESS NOTES
ANNUAL GYNECOLOGY VISIT    Chief Complaint  Gynecologic Exam (BTL consult)      Subjective  Bette Aguilar is a 28 y.o. female who presents today for an annual exam.  Her main concern is that she has a very heavy and painful cycles.  She has been using the NuvaRing for the last 2 years.  She uses it for 3 weeks and takes it out for one week. Her bleeding lasts for 7 days and she has passage of clots. She would also like to discuss sterilization today.  She is sure that she does not want any further children.  Her partner does not want to get a vasectomy.    Preventive Care   Immunization History   Administered Date(s) Administered    PFIZER PURPLE CAP SARS-COV-2 VACCINATION (12+) 2021, 2021     Last Mammogram: NA    Gynecology History and ROS  Current Sexual Activity: Yes  History of sexually transmitted diseases?  no  Abnormal discharge? No  Current Contraception:  NuvaRing    Menstrual History  Patient's last menstrual period was 10/15/2023 (approximate).  Periods are regular  q 28 days, lasting 7 days.   Clots or heavy flow: Yes  Dysmenorrhea: Yes  Intermenstrual bleeding/spotting: No  Significant pain with periods:Yes  Significant Pelvic Pain: No      Pap History  Last pap smear: thinks last year, does not have records, desires repeat pap  History of moderate or severe dysplasia: No    Cancer Risk Assessement:  Family history of:   - Breast cancer: no   - Ovarian cancer: no   - Uterine cancer: no   - Colon cancer: no    Obstetric History  OB History    Para Term  AB Living   3 2 1 1 1 2   SAB IAB Ectopic Molar Multiple Live Births   1         2      # Outcome Date GA Lbr Tex/2nd Weight Sex Delivery Anes PTL Lv   3 2016     SAB   FD   2 Term 14    M CS-Unspec   LUZ      Complications: Fetal Intolerance   1  08/02/10 35w0d   F Vag-Spont   LUZ       Past Medical History  History reviewed. No pertinent past medical history.    Past Surgical History  Past Surgical  History:   Procedure Laterality Date    OTHER      tonsillectomy    PRIMARY C SECTION         Social History  Social History     Socioeconomic History    Marital status:      Spouse name: Not on file    Number of children: Not on file    Years of education: Not on file    Highest education level: 12th grade   Occupational History    Not on file   Tobacco Use    Smoking status: Never    Smokeless tobacco: Never   Vaping Use    Vaping Use: Never used   Substance and Sexual Activity    Alcohol use: No    Drug use: No    Sexual activity: Yes     Partners: Male     Birth control/protection: Ring   Other Topics Concern    Not on file   Social History Narrative    Not on file     Social Determinants of Health     Financial Resource Strain: Low Risk  (9/17/2023)    Overall Financial Resource Strain (CARDIA)     Difficulty of Paying Living Expenses: Not hard at all   Food Insecurity: No Food Insecurity (9/17/2023)    Hunger Vital Sign     Worried About Running Out of Food in the Last Year: Never true     Ran Out of Food in the Last Year: Never true   Transportation Needs: No Transportation Needs (9/17/2023)    PRAPARE - Transportation     Lack of Transportation (Medical): No     Lack of Transportation (Non-Medical): No   Physical Activity: Insufficiently Active (9/17/2023)    Exercise Vital Sign     Days of Exercise per Week: 5 days     Minutes of Exercise per Session: 20 min   Stress: No Stress Concern Present (9/17/2023)    [employer] - Occupational Stress Questionnaire     Feeling of Stress : Not at all   Social Connections: Socially Integrated (9/17/2023)    Social Connection and Isolation Panel [NHANES]     Frequency of Communication with Friends and Family: More than three times a week     Frequency of Social Gatherings with Friends and Family: More than three times a week     Attends Anabaptism Services: More than 4 times per year     Active Member of Clubs or Organizations: Yes      "Attends Club or Organization Meetings: More than 4 times per year     Marital Status:    Intimate Partner Violence: Not on file   Housing Stability: Low Risk  (9/17/2023)    Housing Stability Vital Sign     Unable to Pay for Housing in the Last Year: No     Number of Places Lived in the Last Year: 2     Unstable Housing in the Last Year: No       Family History  Family History   Problem Relation Age of Onset    Hypertension Mother     Diabetes Mother     No Known Problems Father     Hypertension Maternal Grandmother        Home Medications  Current Outpatient Medications on File Prior to Visit   Medication Sig Dispense Refill    buPROPion (WELLBUTRIN XL) 300 MG XL tablet Take 300 mg by mouth every morning.      ethinyl estradiol-etonogestrel (NUVARING) 0.12-0.015 MG/24HR vaginal ring INSERT 1 RING VAGINALLY FOR 3 WEEKS, THEN REMOVE FOR 1 WEEK (Patient not taking: Reported on 11/17/2023)       No current facility-administered medications on file prior to visit.       Allergies/Reactions  Allergies   Allergen Reactions    Other Food      peanuts       ROS  Positive ROS:   Gen: no fevers or chills, excessive fatigue  Respiratory:  no cough or dyspnea  Cardiac:  no chest pain, no palpitations, no syncope  Breast: no breast discharge, pain, lump or skin changes  GI:  no heartburn, no abdominal pain, no nausea or vomiting  Urinary: no dysuria, urgency, frequency, incontinence   Psych: no depression or anxiety  Neuro: no migraines with aura, fainting spells, numbness or tingling  Extremities: no joint pain, persistently swollen ankles, recurrent leg cramps      Physical Examination:  Vital Signs:   Vitals:    11/17/23 1308   BP: 119/78   BP Location: Left arm   Patient Position: Sitting   BP Cuff Size: Adult   Weight: 194 lb   Height: 5' 2\"     Body mass index is 35.48 kg/m².    Constitutional: The patient is well developed and well nourished.  Psychiatric: Patient is oriented to time place and person.   Skin: No " rash observed.  Neck: Appears symmetric.   Respiratory: normal effort  Breast: Deferred  Abdomen: Soft, non-tender.  Pelvic Exam:     Vulva: external female genitalia are normal in appearance. No lesions    Urethra - no lesions, no erythema    Vagina: moist, pink, normal ruggae    Cervix: pink, smooth, no lesions, no CMT    Uterus - non-tender, normal size, shape, contour, mobile    Ovaries: non-tender, no appreciable masses  Pap Smear performed: Yes  Extremeties: Legs are symmetric and without tenderness. There is no edema present.    Assessment & Plan  Bette Aguilar is a 28 y.o. female who presents today for an annual exam.    1. Women's annual routine gynecological examination  - THINPREP PAP WITH HPV; Future  - ethinyl estradiol-etonogestrel (NUVARING) 0.12-0.015 MG/24HR vaginal ring; Insert 1 Each into the vagina every 4 weeks.  Dispense: 3 Each; Refill: 3     -pelvic exam completed  -Pap: collected   -Mammogram: NA   -Colonoscopy: NA  -Advised on breast self awareness  -Contraception- refill sent for NuvaRing    2. AUB  - I discussed using the NuvaRing continuously and only having a cycle about once every 3 months.  She would like to try this to see if this helps with her heavy, crampy cycles. I also discussed possible blood work and ultrasound.  She declines this for now.    3. Contraception  - Desires sterilization  - Risks discussed including regret. She states that she is sure she does not want any further children.     Return: For pre-op appointment to discuss laparoscopic bilateral salpingectomy.     Jennifer Lucas M.D.

## 2023-11-17 NOTE — PROGRESS NOTES
Patient here for GYN exam.  BTL consult  LMP= 10/15/23  BCM: nuvaring  Last pap: never  Last mammogram if applies: n/a  Pharmacy verified   Pt states she has a ball on check for a few months, cyst on the back of right leg

## 2023-11-22 LAB
CYTOLOGIST CVX/VAG CYTO: NORMAL
CYTOLOGY CVX/VAG DOC CYTO: NORMAL
CYTOLOGY CVX/VAG DOC THIN PREP: NORMAL
HPV I/H RISK 4 DNA CVX QL PROBE+SIG AMP: NEGATIVE
NOTE NL11727A: NORMAL
OTHER STN SPEC: NORMAL
STAT OF ADQ CVX/VAG CYTO-IMP: NORMAL

## 2023-11-28 ENCOUNTER — HOSPITAL ENCOUNTER (OUTPATIENT)
Facility: MEDICAL CENTER | Age: 28
End: 2023-11-28
Attending: OBSTETRICS & GYNECOLOGY | Admitting: OBSTETRICS & GYNECOLOGY
Payer: COMMERCIAL

## 2023-12-05 ENCOUNTER — APPOINTMENT (OUTPATIENT)
Dept: ADMISSIONS | Facility: MEDICAL CENTER | Age: 28
End: 2023-12-05
Attending: OBSTETRICS & GYNECOLOGY
Payer: COMMERCIAL

## 2023-12-12 ENCOUNTER — PRE-ADMISSION TESTING (OUTPATIENT)
Dept: ADMISSIONS | Facility: MEDICAL CENTER | Age: 28
End: 2023-12-12
Attending: OBSTETRICS & GYNECOLOGY
Payer: COMMERCIAL

## 2023-12-12 RX ORDER — LORATADINE 10 MG/1
10 TABLET ORAL DAILY
COMMUNITY

## 2023-12-21 ENCOUNTER — APPOINTMENT (OUTPATIENT)
Dept: ADMISSIONS | Facility: MEDICAL CENTER | Age: 28
End: 2023-12-21
Attending: OBSTETRICS & GYNECOLOGY
Payer: COMMERCIAL

## 2023-12-21 DIAGNOSIS — Z01.812 PRE-OPERATIVE LABORATORY EXAMINATION: ICD-10-CM

## 2024-01-03 ENCOUNTER — OFFICE VISIT (OUTPATIENT)
Dept: URGENT CARE | Facility: CLINIC | Age: 29
End: 2024-01-03
Payer: COMMERCIAL

## 2024-01-03 VITALS
TEMPERATURE: 97.8 F | OXYGEN SATURATION: 98 % | BODY MASS INDEX: 35.7 KG/M2 | HEART RATE: 94 BPM | SYSTOLIC BLOOD PRESSURE: 114 MMHG | DIASTOLIC BLOOD PRESSURE: 60 MMHG | RESPIRATION RATE: 18 BRPM | HEIGHT: 62 IN | WEIGHT: 194 LBS

## 2024-01-03 DIAGNOSIS — B34.9 VIRAL ILLNESS: ICD-10-CM

## 2024-01-03 DIAGNOSIS — R50.9 FEVER, UNSPECIFIED FEVER CAUSE: ICD-10-CM

## 2024-01-03 LAB
FLUAV RNA SPEC QL NAA+PROBE: NEGATIVE
FLUBV RNA SPEC QL NAA+PROBE: NEGATIVE
RSV RNA SPEC QL NAA+PROBE: NEGATIVE
SARS-COV-2 RNA RESP QL NAA+PROBE: NEGATIVE

## 2024-01-03 PROCEDURE — 3074F SYST BP LT 130 MM HG: CPT | Performed by: PHYSICIAN ASSISTANT

## 2024-01-03 PROCEDURE — 3078F DIAST BP <80 MM HG: CPT | Performed by: PHYSICIAN ASSISTANT

## 2024-01-03 PROCEDURE — 99203 OFFICE O/P NEW LOW 30 MIN: CPT | Performed by: PHYSICIAN ASSISTANT

## 2024-01-03 PROCEDURE — 0241U POCT CEPHEID COV-2, FLU A/B, RSV - PCR: CPT | Performed by: PHYSICIAN ASSISTANT

## 2024-01-03 RX ORDER — BENZONATATE 100 MG/1
100 CAPSULE ORAL 3 TIMES DAILY PRN
Qty: 30 CAPSULE | Refills: 0 | Status: ON HOLD | OUTPATIENT
Start: 2024-01-03 | End: 2024-01-08

## 2024-01-03 RX ORDER — ALBUTEROL SULFATE 90 UG/1
2 AEROSOL, METERED RESPIRATORY (INHALATION) EVERY 6 HOURS PRN
Qty: 8.5 G | Refills: 0 | Status: ON HOLD | OUTPATIENT
Start: 2024-01-03 | End: 2024-01-08

## 2024-01-03 ASSESSMENT — ENCOUNTER SYMPTOMS
VOMITING: 0
COUGH: 1
MYALGIAS: 1
HEADACHES: 1
WHEEZING: 0
SORE THROAT: 0
ABDOMINAL PAIN: 0
FEVER: 1
SPUTUM PRODUCTION: 0
SWEATS: 1
CARDIOVASCULAR NEGATIVE: 1
RHINORRHEA: 1
CHILLS: 1
DIZZINESS: 0
DIARRHEA: 0
SHORTNESS OF BREATH: 0
NAUSEA: 0

## 2024-01-03 NOTE — LETTER
January 3, 2024         Patient: Bette Aguilar   YOB: 1995   Date of Visit: 1/3/2024           To Whom it May Concern:    Bette Aguilar was seen in my clinic on 1/3/2024. Please excuse any absences from work this week due to acute illness.      If you have any questions or concerns, please don't hesitate to call.        Sincerely,           Ben Pandey P.A.-C.  Electronically Signed

## 2024-01-04 NOTE — PROGRESS NOTES
Subjective     Bette Aguilar is a very pleasant 28 y.o. female who presents with Chills (Headache(back of head), cough, x last night)            Cough  This is a new problem. The current episode started yesterday. The problem has been unchanged. The problem occurs every few minutes. The cough is Non-productive. Associated symptoms include chills, a fever, headaches, myalgias, rhinorrhea and sweats. Pertinent negatives include no chest pain, ear congestion, ear pain, nasal congestion, postnasal drip, sore throat, shortness of breath or wheezing. She has tried OTC cough suppressant and a beta-agonist inhaler (NSAIDS) for the symptoms. The treatment provided mild relief. Her past medical history is significant for asthma. There is no history of bronchitis or pneumonia.       PMH:  has a past medical history of Asthma and Psychiatric problem.    She has no past medical history of CAD (coronary artery disease), Cancer ([facility]), Congestive heart failure ([facility]), COPD, Diabetes, Infectious disease, or Stroke ([facility]).  MEDS:   Current Outpatient Medications:     benzonatate (TESSALON) 100 MG Cap, Take 1 Capsule by mouth 3 times a day as needed for Cough., Disp: 30 Capsule, Rfl: 0    albuterol 108 (90 Base) MCG/ACT Aero Soln inhalation aerosol, Inhale 2 Puffs every 6 hours as needed for Shortness of Breath., Disp: 8.5 g, Rfl: 0    multivitamin Tab, Take 1 Tablet by mouth every day., Disp: , Rfl:     loratadine (CLARITIN) 10 MG Tab, Take 10 mg by mouth every day., Disp: , Rfl:     ethinyl estradiol-etonogestrel (NUVARING) 0.12-0.015 MG/24HR vaginal ring, , Disp: , Rfl:   ALLERGIES:   Allergies   Allergen Reactions    Other Food      Peanuts - face gets really red and dry    [place]      SURGHX:   Past Surgical History:   Procedure Laterality Date    OTHER      tonsillectomy    PRIMARY C SECTION       SOCHX:  reports that she has never smoked. She has been exposed to tobacco smoke. She has never used smokeless tobacco. She  "reports that she does not drink alcohol and does not use drugs.  FH: family history includes Diabetes in her mother; Hypertension in her maternal grandmother and mother; No Known Problems in her father.        Review of Systems   Constitutional:  Positive for chills, fever and malaise/fatigue.   HENT:  Positive for congestion and rhinorrhea. Negative for ear pain, postnasal drip and sore throat.    Respiratory:  Positive for cough. Negative for sputum production, shortness of breath and wheezing.    Cardiovascular: Negative.  Negative for chest pain.   Gastrointestinal:  Negative for abdominal pain, diarrhea, nausea and vomiting.   Musculoskeletal:  Positive for myalgias.   Neurological:  Positive for headaches. Negative for dizziness.         Medications, Allergies, and current problem list reviewed today in Epic           Objective     /60   Pulse 94   Temp 36.6 °C (97.8 °F)   Resp 18   Ht 1.575 m (5' 2\")   Wt 88 kg (194 lb)   LMP 11/12/2023 (Approximate)   SpO2 98%   BMI 35.48 kg/m²      Physical Exam  Vitals and nursing note reviewed.   Constitutional:       General: She is not in acute distress.     Appearance: Normal appearance. She is well-developed. She is not ill-appearing, toxic-appearing or diaphoretic.   HENT:      Head: Normocephalic and atraumatic.      Right Ear: Tympanic membrane, ear canal and external ear normal.      Left Ear: Tympanic membrane, ear canal and external ear normal.      Nose: Rhinorrhea present. No congestion.      Mouth/Throat:      Mouth: Mucous membranes are moist.      Pharynx: Posterior oropharyngeal erythema present. No oropharyngeal exudate.   Eyes:      General:         Right eye: No discharge.         Left eye: No discharge.      Conjunctiva/sclera: Conjunctivae normal.   Cardiovascular:      Rate and Rhythm: Normal rate and regular rhythm.      Pulses: Normal pulses.      Heart sounds: Normal heart sounds.   Pulmonary:      Effort: Pulmonary effort is normal. " No respiratory distress.      Breath sounds: Normal breath sounds. No wheezing, rhonchi or rales.   Musculoskeletal:      Cervical back: Normal range of motion and neck supple.      Right lower leg: No edema.      Left lower leg: No edema.   Lymphadenopathy:      Cervical: No cervical adenopathy.   Skin:     General: Skin is warm and dry.   Neurological:      General: No focal deficit present.      Mental Status: She is alert and oriented to person, place, and time. Mental status is at baseline.   Psychiatric:         Mood and Affect: Mood normal.         Behavior: Behavior normal.         Thought Content: Thought content normal.         Judgment: Judgment normal.                             Assessment & Plan     This is a very pleasant 28-year-old female presenting with viral URI symptoms for the past 2 days.  New onset fever, chills, body aches, headache and fatigue today.  No shortness of breath, chest pain, wheezing or leg swelling.  Eating and drinking without vomiting or diarrhea.  No known sick contacts.  History of mild asthma, has not needed her albuterol.  pO2 adequate vital signs normal.  Nasal congestion and rhinorrhea noted on exam.  Lungs are clear bilaterally without wheezing, rhonchi, rales or stridor.  Remainder of exam unremarkable.  In clinic COVID, flu, RSV testing negative. No clinical symptoms/signs of focal bacterial infection.  Patient will be treated for self-limiting viral URI with OTC meds, conservative measures, and symptomatic relief.  ER and red flag symptoms discussed at length.        1. Fever, unspecified fever cause  POCT CEPHEID COV-2, FLU A/B, RSV - PCR      2. Viral illness  benzonatate (TESSALON) 100 MG Cap    albuterol 108 (90 Base) MCG/ACT Aero Soln inhalation aerosol          I personally reviewed prior external notes and test results pertinent to today's visit. Return to clinic or go to ED if symptoms worsen or persist. Red flag symptoms and indications for ED discussed at  length. Patient/Parent/Guardian voices understanding.  AVS with post-visit instructions provided or given verbally.  Follow-up with your primary care provider in 3-5 days. All side effects and potential interactions of prescribed medication discussed including allergic response, GI upset, tendon injury, rash, sedation, OCP effectiveness, etc.    Please note that this dictation was created using voice recognition software. I have made every reasonable attempt to correct obvious errors, but I expect that there are errors of grammar and possibly content that I did not discover before finalizing the note.

## 2024-01-05 ENCOUNTER — APPOINTMENT (OUTPATIENT)
Dept: RADIOLOGY | Facility: MEDICAL CENTER | Age: 29
DRG: 690 | End: 2024-01-05
Attending: EMERGENCY MEDICINE
Payer: COMMERCIAL

## 2024-01-05 ENCOUNTER — HOSPITAL ENCOUNTER (INPATIENT)
Facility: MEDICAL CENTER | Age: 29
LOS: 3 days | DRG: 690 | End: 2024-01-08
Attending: EMERGENCY MEDICINE | Admitting: STUDENT IN AN ORGANIZED HEALTH CARE EDUCATION/TRAINING PROGRAM
Payer: COMMERCIAL

## 2024-01-05 DIAGNOSIS — F33.40 RECURRENT MAJOR DEPRESSIVE DISORDER, IN REMISSION (HCC): ICD-10-CM

## 2024-01-05 DIAGNOSIS — E87.6 HYPOKALEMIA: ICD-10-CM

## 2024-01-05 DIAGNOSIS — B34.9 VIRAL ILLNESS: ICD-10-CM

## 2024-01-05 DIAGNOSIS — R78.81 BACTEREMIA: ICD-10-CM

## 2024-01-05 DIAGNOSIS — N12 PYELONEPHRITIS: ICD-10-CM

## 2024-01-05 DIAGNOSIS — R50.9 ACUTE FEBRILE ILLNESS: ICD-10-CM

## 2024-01-05 PROBLEM — R65.10 SIRS (SYSTEMIC INFLAMMATORY RESPONSE SYNDROME) (HCC): Status: ACTIVE | Noted: 2024-01-05

## 2024-01-05 PROBLEM — N10 ACUTE PYELONEPHRITIS: Status: ACTIVE | Noted: 2024-01-05

## 2024-01-05 LAB
ALBUMIN SERPL BCP-MCNC: 3.5 G/DL (ref 3.2–4.9)
ALBUMIN/GLOB SERPL: 1 G/DL
ALP SERPL-CCNC: 107 U/L (ref 30–99)
ALT SERPL-CCNC: 18 U/L (ref 2–50)
ANION GAP SERPL CALC-SCNC: 17 MMOL/L (ref 7–16)
APPEARANCE UR: ABNORMAL
AST SERPL-CCNC: 21 U/L (ref 12–45)
BACTERIA #/AREA URNS HPF: ABNORMAL /HPF
BASOPHILS # BLD AUTO: 0.3 % (ref 0–1.8)
BASOPHILS # BLD: 0.04 K/UL (ref 0–0.12)
BILIRUB SERPL-MCNC: 0.6 MG/DL (ref 0.1–1.5)
BILIRUB UR QL STRIP.AUTO: NEGATIVE
BUN SERPL-MCNC: 7 MG/DL (ref 8–22)
CALCIUM ALBUM COR SERPL-MCNC: 8.4 MG/DL (ref 8.5–10.5)
CALCIUM SERPL-MCNC: 8 MG/DL (ref 8.5–10.5)
CHLORIDE SERPL-SCNC: 102 MMOL/L (ref 96–112)
CO2 SERPL-SCNC: 20 MMOL/L (ref 20–33)
COLOR UR: YELLOW
CREAT SERPL-MCNC: 0.68 MG/DL (ref 0.5–1.4)
EKG IMPRESSION: NORMAL
EOSINOPHIL # BLD AUTO: 0.01 K/UL (ref 0–0.51)
EOSINOPHIL NFR BLD: 0.1 % (ref 0–6.9)
EPI CELLS #/AREA URNS HPF: ABNORMAL /HPF
ERYTHROCYTE [DISTWIDTH] IN BLOOD BY AUTOMATED COUNT: 37.5 FL (ref 35.9–50)
FLUAV RNA SPEC QL NAA+PROBE: NEGATIVE
FLUBV RNA SPEC QL NAA+PROBE: NEGATIVE
GFR SERPLBLD CREATININE-BSD FMLA CKD-EPI: 119 ML/MIN/1.73 M 2
GLOBULIN SER CALC-MCNC: 3.5 G/DL (ref 1.9–3.5)
GLUCOSE SERPL-MCNC: 139 MG/DL (ref 65–99)
GLUCOSE UR STRIP.AUTO-MCNC: NEGATIVE MG/DL
HCG SERPL QL: NEGATIVE
HCT VFR BLD AUTO: 36.8 % (ref 37–47)
HGB BLD-MCNC: 12.9 G/DL (ref 12–16)
IMM GRANULOCYTES # BLD AUTO: 0.09 K/UL (ref 0–0.11)
IMM GRANULOCYTES NFR BLD AUTO: 0.6 % (ref 0–0.9)
KETONES UR STRIP.AUTO-MCNC: NEGATIVE MG/DL
LACTATE SERPL-SCNC: 1.1 MMOL/L (ref 0.5–2)
LACTATE SERPL-SCNC: 1.2 MMOL/L (ref 0.5–2)
LEUKOCYTE ESTERASE UR QL STRIP.AUTO: ABNORMAL
LYMPHOCYTES # BLD AUTO: 0.63 K/UL (ref 1–4.8)
LYMPHOCYTES NFR BLD: 4.4 % (ref 22–41)
MAGNESIUM SERPL-MCNC: 1.5 MG/DL (ref 1.5–2.5)
MCH RBC QN AUTO: 28.8 PG (ref 27–33)
MCHC RBC AUTO-ENTMCNC: 35.1 G/DL (ref 32.2–35.5)
MCV RBC AUTO: 82.1 FL (ref 81.4–97.8)
MICRO URNS: ABNORMAL
MONOCYTES # BLD AUTO: 1.46 K/UL (ref 0–0.85)
MONOCYTES NFR BLD AUTO: 10.2 % (ref 0–13.4)
NEUTROPHILS # BLD AUTO: 12.06 K/UL (ref 1.82–7.42)
NEUTROPHILS NFR BLD: 84.4 % (ref 44–72)
NITRITE UR QL STRIP.AUTO: POSITIVE
NRBC # BLD AUTO: 0 K/UL
NRBC BLD-RTO: 0 /100 WBC (ref 0–0.2)
PH UR STRIP.AUTO: 6 [PH] (ref 5–8)
PHOSPHATE SERPL-MCNC: 3 MG/DL (ref 2.5–4.5)
PLATELET # BLD AUTO: 212 K/UL (ref 164–446)
PMV BLD AUTO: 10.2 FL (ref 9–12.9)
POTASSIUM SERPL-SCNC: 2.6 MMOL/L (ref 3.6–5.5)
PROT SERPL-MCNC: 7 G/DL (ref 6–8.2)
PROT UR QL STRIP: 30 MG/DL
RBC # BLD AUTO: 4.48 M/UL (ref 4.2–5.4)
RBC # URNS HPF: ABNORMAL /HPF
RBC UR QL AUTO: ABNORMAL
RENAL EPI CELLS #/AREA URNS HPF: NEGATIVE /HPF
RSV RNA SPEC QL NAA+PROBE: NEGATIVE
SARS-COV-2 RNA RESP QL NAA+PROBE: NOTDETECTED
SODIUM SERPL-SCNC: 139 MMOL/L (ref 135–145)
SP GR UR STRIP.AUTO: 1.01
UROBILINOGEN UR STRIP.AUTO-MCNC: 4 MG/DL
WBC # BLD AUTO: 14.3 K/UL (ref 4.8–10.8)
WBC #/AREA URNS HPF: ABNORMAL /HPF

## 2024-01-05 PROCEDURE — 96365 THER/PROPH/DIAG IV INF INIT: CPT

## 2024-01-05 PROCEDURE — 87086 URINE CULTURE/COLONY COUNT: CPT

## 2024-01-05 PROCEDURE — 83605 ASSAY OF LACTIC ACID: CPT

## 2024-01-05 PROCEDURE — 93005 ELECTROCARDIOGRAM TRACING: CPT | Performed by: EMERGENCY MEDICINE

## 2024-01-05 PROCEDURE — 770020 HCHG ROOM/CARE - TELE (206)

## 2024-01-05 PROCEDURE — 71045 X-RAY EXAM CHEST 1 VIEW: CPT

## 2024-01-05 PROCEDURE — 36415 COLL VENOUS BLD VENIPUNCTURE: CPT

## 2024-01-05 PROCEDURE — 700102 HCHG RX REV CODE 250 W/ 637 OVERRIDE(OP): Mod: JZ | Performed by: STUDENT IN AN ORGANIZED HEALTH CARE EDUCATION/TRAINING PROGRAM

## 2024-01-05 PROCEDURE — A9270 NON-COVERED ITEM OR SERVICE: HCPCS | Performed by: EMERGENCY MEDICINE

## 2024-01-05 PROCEDURE — 99285 EMERGENCY DEPT VISIT HI MDM: CPT

## 2024-01-05 PROCEDURE — 700105 HCHG RX REV CODE 258: Performed by: STUDENT IN AN ORGANIZED HEALTH CARE EDUCATION/TRAINING PROGRAM

## 2024-01-05 PROCEDURE — 87077 CULTURE AEROBIC IDENTIFY: CPT | Mod: 91

## 2024-01-05 PROCEDURE — 700102 HCHG RX REV CODE 250 W/ 637 OVERRIDE(OP): Performed by: EMERGENCY MEDICINE

## 2024-01-05 PROCEDURE — 96375 TX/PRO/DX INJ NEW DRUG ADDON: CPT

## 2024-01-05 PROCEDURE — 0241U HCHG SARS-COV-2 COVID-19 NFCT DS RESP RNA 4 TRGT ED POC: CPT

## 2024-01-05 PROCEDURE — 700105 HCHG RX REV CODE 258: Performed by: EMERGENCY MEDICINE

## 2024-01-05 PROCEDURE — 700111 HCHG RX REV CODE 636 W/ 250 OVERRIDE (IP): Performed by: EMERGENCY MEDICINE

## 2024-01-05 PROCEDURE — 85025 COMPLETE CBC W/AUTO DIFF WBC: CPT

## 2024-01-05 PROCEDURE — 84100 ASSAY OF PHOSPHORUS: CPT

## 2024-01-05 PROCEDURE — 700101 HCHG RX REV CODE 250: Performed by: STUDENT IN AN ORGANIZED HEALTH CARE EDUCATION/TRAINING PROGRAM

## 2024-01-05 PROCEDURE — 700111 HCHG RX REV CODE 636 W/ 250 OVERRIDE (IP): Mod: JZ | Performed by: STUDENT IN AN ORGANIZED HEALTH CARE EDUCATION/TRAINING PROGRAM

## 2024-01-05 PROCEDURE — 84703 CHORIONIC GONADOTROPIN ASSAY: CPT

## 2024-01-05 PROCEDURE — 99223 1ST HOSP IP/OBS HIGH 75: CPT | Performed by: STUDENT IN AN ORGANIZED HEALTH CARE EDUCATION/TRAINING PROGRAM

## 2024-01-05 PROCEDURE — 87186 SC STD MICRODIL/AGAR DIL: CPT | Mod: 91

## 2024-01-05 PROCEDURE — 83735 ASSAY OF MAGNESIUM: CPT

## 2024-01-05 PROCEDURE — A9270 NON-COVERED ITEM OR SERVICE: HCPCS | Mod: JZ | Performed by: STUDENT IN AN ORGANIZED HEALTH CARE EDUCATION/TRAINING PROGRAM

## 2024-01-05 PROCEDURE — 87040 BLOOD CULTURE FOR BACTERIA: CPT

## 2024-01-05 PROCEDURE — 81001 URINALYSIS AUTO W/SCOPE: CPT

## 2024-01-05 PROCEDURE — 80053 COMPREHEN METABOLIC PANEL: CPT

## 2024-01-05 PROCEDURE — 87015 SPECIMEN INFECT AGNT CONCNTJ: CPT

## 2024-01-05 RX ORDER — ACETAMINOPHEN 325 MG/1
650 TABLET ORAL EVERY 6 HOURS PRN
Status: DISCONTINUED | OUTPATIENT
Start: 2024-01-05 | End: 2024-01-05

## 2024-01-05 RX ORDER — OXYCODONE HYDROCHLORIDE 5 MG/1
2.5 TABLET ORAL
Status: DISCONTINUED | OUTPATIENT
Start: 2024-01-05 | End: 2024-01-08 | Stop reason: HOSPADM

## 2024-01-05 RX ORDER — PROCHLORPERAZINE EDISYLATE 5 MG/ML
5-10 INJECTION INTRAMUSCULAR; INTRAVENOUS EVERY 4 HOURS PRN
Status: DISCONTINUED | OUTPATIENT
Start: 2024-01-05 | End: 2024-01-08 | Stop reason: HOSPADM

## 2024-01-05 RX ORDER — BENZONATATE 100 MG/1
100 CAPSULE ORAL 3 TIMES DAILY PRN
Status: DISCONTINUED | OUTPATIENT
Start: 2024-01-05 | End: 2024-01-08 | Stop reason: HOSPADM

## 2024-01-05 RX ORDER — KETOROLAC TROMETHAMINE 30 MG/ML
15 INJECTION, SOLUTION INTRAMUSCULAR; INTRAVENOUS EVERY 6 HOURS
Status: DISCONTINUED | OUTPATIENT
Start: 2024-01-05 | End: 2024-01-05

## 2024-01-05 RX ORDER — IBUPROFEN 800 MG/1
800 TABLET ORAL 3 TIMES DAILY PRN
Status: DISCONTINUED | OUTPATIENT
Start: 2024-01-09 | End: 2024-01-06

## 2024-01-05 RX ORDER — POTASSIUM CHLORIDE 20 MEQ/1
40 TABLET, EXTENDED RELEASE ORAL ONCE
Status: COMPLETED | OUTPATIENT
Start: 2024-01-05 | End: 2024-01-05

## 2024-01-05 RX ORDER — LABETALOL HYDROCHLORIDE 5 MG/ML
10 INJECTION, SOLUTION INTRAVENOUS EVERY 4 HOURS PRN
Status: DISCONTINUED | OUTPATIENT
Start: 2024-01-05 | End: 2024-01-08 | Stop reason: HOSPADM

## 2024-01-05 RX ORDER — MORPHINE SULFATE 4 MG/ML
2 INJECTION INTRAVENOUS
Status: DISCONTINUED | OUTPATIENT
Start: 2024-01-05 | End: 2024-01-08 | Stop reason: HOSPADM

## 2024-01-05 RX ORDER — ONDANSETRON 2 MG/ML
4 INJECTION INTRAMUSCULAR; INTRAVENOUS EVERY 4 HOURS PRN
Status: DISCONTINUED | OUTPATIENT
Start: 2024-01-05 | End: 2024-01-08 | Stop reason: HOSPADM

## 2024-01-05 RX ORDER — DIPHENHYDRAMINE HCL 25 MG
30 CAPSULE ORAL EVERY 6 HOURS PRN
COMMUNITY
End: 2024-01-16

## 2024-01-05 RX ORDER — POTASSIUM CHLORIDE 7.45 MG/ML
10 INJECTION INTRAVENOUS ONCE
Status: COMPLETED | OUTPATIENT
Start: 2024-01-05 | End: 2024-01-05

## 2024-01-05 RX ORDER — ACETAMINOPHEN 325 MG/1
650 TABLET ORAL EVERY 6 HOURS PRN
Status: DISCONTINUED | OUTPATIENT
Start: 2024-01-10 | End: 2024-01-08 | Stop reason: HOSPADM

## 2024-01-05 RX ORDER — CEFTRIAXONE 2 G/1
2000 INJECTION, POWDER, FOR SOLUTION INTRAMUSCULAR; INTRAVENOUS ONCE
Status: COMPLETED | OUTPATIENT
Start: 2024-01-05 | End: 2024-01-05

## 2024-01-05 RX ORDER — ONDANSETRON 4 MG/1
4 TABLET, ORALLY DISINTEGRATING ORAL EVERY 4 HOURS PRN
Status: DISCONTINUED | OUTPATIENT
Start: 2024-01-05 | End: 2024-01-08 | Stop reason: HOSPADM

## 2024-01-05 RX ORDER — SODIUM CHLORIDE AND POTASSIUM CHLORIDE 300; 900 MG/100ML; MG/100ML
INJECTION, SOLUTION INTRAVENOUS CONTINUOUS
Status: DISCONTINUED | OUTPATIENT
Start: 2024-01-05 | End: 2024-01-08 | Stop reason: HOSPADM

## 2024-01-05 RX ORDER — IBUPROFEN 600 MG/1
600 TABLET ORAL ONCE
Status: COMPLETED | OUTPATIENT
Start: 2024-01-05 | End: 2024-01-05

## 2024-01-05 RX ORDER — ACETAMINOPHEN 325 MG/1
650 TABLET ORAL ONCE
Status: COMPLETED | OUTPATIENT
Start: 2024-01-05 | End: 2024-01-05

## 2024-01-05 RX ORDER — HEPARIN SODIUM 5000 [USP'U]/ML
5000 INJECTION, SOLUTION INTRAVENOUS; SUBCUTANEOUS EVERY 8 HOURS
Status: DISCONTINUED | OUTPATIENT
Start: 2024-01-05 | End: 2024-01-08 | Stop reason: HOSPADM

## 2024-01-05 RX ORDER — IBUPROFEN 200 MG
600-1000 TABLET ORAL EVERY 6 HOURS PRN
COMMUNITY

## 2024-01-05 RX ORDER — PROMETHAZINE HYDROCHLORIDE 25 MG/1
12.5-25 TABLET ORAL EVERY 4 HOURS PRN
Status: DISCONTINUED | OUTPATIENT
Start: 2024-01-05 | End: 2024-01-08 | Stop reason: HOSPADM

## 2024-01-05 RX ORDER — SODIUM CHLORIDE 9 MG/ML
1000 INJECTION, SOLUTION INTRAVENOUS ONCE
Status: COMPLETED | OUTPATIENT
Start: 2024-01-05 | End: 2024-01-05

## 2024-01-05 RX ORDER — PROMETHAZINE HYDROCHLORIDE 12.5 MG/1
12.5-25 SUPPOSITORY RECTAL EVERY 4 HOURS PRN
Status: DISCONTINUED | OUTPATIENT
Start: 2024-01-05 | End: 2024-01-08 | Stop reason: HOSPADM

## 2024-01-05 RX ORDER — POTASSIUM CHLORIDE 20 MEQ/1
40 TABLET, EXTENDED RELEASE ORAL EVERY 4 HOURS
Status: COMPLETED | OUTPATIENT
Start: 2024-01-05 | End: 2024-01-06

## 2024-01-05 RX ORDER — SODIUM CHLORIDE, SODIUM LACTATE, POTASSIUM CHLORIDE, AND CALCIUM CHLORIDE .6; .31; .03; .02 G/100ML; G/100ML; G/100ML; G/100ML
500 INJECTION, SOLUTION INTRAVENOUS
Status: DISCONTINUED | OUTPATIENT
Start: 2024-01-05 | End: 2024-01-08 | Stop reason: HOSPADM

## 2024-01-05 RX ORDER — ACETAMINOPHEN 325 MG/1
650 TABLET ORAL EVERY 6 HOURS
Status: DISCONTINUED | OUTPATIENT
Start: 2024-01-05 | End: 2024-01-08 | Stop reason: HOSPADM

## 2024-01-05 RX ORDER — POLYETHYLENE GLYCOL 3350 17 G/17G
1 POWDER, FOR SOLUTION ORAL
Status: DISCONTINUED | OUTPATIENT
Start: 2024-01-05 | End: 2024-01-08 | Stop reason: HOSPADM

## 2024-01-05 RX ORDER — KETOROLAC TROMETHAMINE 30 MG/ML
15 INJECTION, SOLUTION INTRAMUSCULAR; INTRAVENOUS EVERY 6 HOURS
Status: DISCONTINUED | OUTPATIENT
Start: 2024-01-06 | End: 2024-01-06

## 2024-01-05 RX ORDER — BISACODYL 10 MG
10 SUPPOSITORY, RECTAL RECTAL
Status: DISCONTINUED | OUTPATIENT
Start: 2024-01-05 | End: 2024-01-08 | Stop reason: HOSPADM

## 2024-01-05 RX ORDER — OXYCODONE HYDROCHLORIDE 5 MG/1
5 TABLET ORAL
Status: DISCONTINUED | OUTPATIENT
Start: 2024-01-05 | End: 2024-01-08 | Stop reason: HOSPADM

## 2024-01-05 RX ORDER — AMOXICILLIN 250 MG
2 CAPSULE ORAL 2 TIMES DAILY
Status: DISCONTINUED | OUTPATIENT
Start: 2024-01-05 | End: 2024-01-08 | Stop reason: HOSPADM

## 2024-01-05 RX ADMIN — POTASSIUM CHLORIDE 40 MEQ: 1500 TABLET, EXTENDED RELEASE ORAL at 17:00

## 2024-01-05 RX ADMIN — ACETAMINOPHEN 650 MG: 325 TABLET, FILM COATED ORAL at 19:52

## 2024-01-05 RX ADMIN — HEPARIN SODIUM 5000 UNITS: 5000 INJECTION, SOLUTION INTRAVENOUS; SUBCUTANEOUS at 21:14

## 2024-01-05 RX ADMIN — POTASSIUM CHLORIDE AND SODIUM CHLORIDE: 900; 300 INJECTION, SOLUTION INTRAVENOUS at 19:58

## 2024-01-05 RX ADMIN — POTASSIUM CHLORIDE 10 MEQ: 7.46 INJECTION, SOLUTION INTRAVENOUS at 17:01

## 2024-01-05 RX ADMIN — POTASSIUM CHLORIDE 40 MEQ: 1500 TABLET, EXTENDED RELEASE ORAL at 21:14

## 2024-01-05 RX ADMIN — MAGNESIUM SULFATE HEPTAHYDRATE 3 G: 500 INJECTION, SOLUTION INTRAMUSCULAR; INTRAVENOUS at 20:14

## 2024-01-05 RX ADMIN — ACETAMINOPHEN 650 MG: 325 TABLET, FILM COATED ORAL at 15:32

## 2024-01-05 RX ADMIN — CEFTRIAXONE SODIUM 2000 MG: 2 INJECTION, POWDER, FOR SOLUTION INTRAMUSCULAR; INTRAVENOUS at 17:51

## 2024-01-05 RX ADMIN — DOCUSATE SODIUM 50 MG AND SENNOSIDES 8.6 MG 2 TABLET: 8.6; 5 TABLET, FILM COATED ORAL at 19:52

## 2024-01-05 RX ADMIN — SODIUM CHLORIDE 1000 ML: 9 INJECTION, SOLUTION INTRAVENOUS at 15:37

## 2024-01-05 RX ADMIN — IBUPROFEN 600 MG: 600 TABLET, FILM COATED ORAL at 16:28

## 2024-01-05 ASSESSMENT — ENCOUNTER SYMPTOMS
COUGH: 1
VOMITING: 0
FLANK PAIN: 1
SPUTUM PRODUCTION: 0
HEARTBURN: 0
MYALGIAS: 1
CHILLS: 1
DOUBLE VISION: 0
SHORTNESS OF BREATH: 1
BRUISES/BLEEDS EASILY: 0
FEVER: 1
HEADACHES: 0
DIZZINESS: 0
PALPITATIONS: 1
ABDOMINAL PAIN: 0
BLURRED VISION: 0
NAUSEA: 1
WEAKNESS: 1
DEPRESSION: 0

## 2024-01-05 ASSESSMENT — LIFESTYLE VARIABLES: SUBSTANCE_ABUSE: 0

## 2024-01-05 ASSESSMENT — FIBROSIS 4 INDEX: FIB4 SCORE: 0.7

## 2024-01-05 NOTE — ED TRIAGE NOTES
"Chief Complaint   Patient presents with    Body Aches     X 2 days, went to urgent care 2 days ago, covid and flu negative     Vomiting     Starting today    Chills     X 2 days    Fever     101.1F in triage, started feeling feverish today     Pt to triage in WC for above complaints, VSS on RA, GCS 15, NAD.    POC covid/flu/rsv swab collected in triage.    Pt returned to [facility]. Educated on triage process and to inform staff of any changes.     /67   Pulse (!) 110   Temp (!) 38.4 °C (101.1 °F) (Temporal)   Resp 16   Ht 1.575 m (5' 2\")   Wt 83.9 kg (185 lb)   LMP 11/12/2023 (Approximate)   SpO2 98%   BMI 33.84 kg/m²     "

## 2024-01-06 LAB
ALBUMIN SERPL BCP-MCNC: 3.1 G/DL (ref 3.2–4.9)
ALBUMIN/GLOB SERPL: 1 G/DL
ALP SERPL-CCNC: 107 U/L (ref 30–99)
ALT SERPL-CCNC: 20 U/L (ref 2–50)
ANION GAP SERPL CALC-SCNC: 12 MMOL/L (ref 7–16)
AST SERPL-CCNC: 21 U/L (ref 12–45)
BASOPHILS # BLD AUTO: 0.2 % (ref 0–1.8)
BASOPHILS # BLD: 0.03 K/UL (ref 0–0.12)
BILIRUB SERPL-MCNC: 0.7 MG/DL (ref 0.1–1.5)
BUN SERPL-MCNC: 7 MG/DL (ref 8–22)
CALCIUM ALBUM COR SERPL-MCNC: 8.2 MG/DL (ref 8.5–10.5)
CALCIUM SERPL-MCNC: 7.5 MG/DL (ref 8.5–10.5)
CHLORIDE SERPL-SCNC: 106 MMOL/L (ref 96–112)
CO2 SERPL-SCNC: 22 MMOL/L (ref 20–33)
CREAT SERPL-MCNC: 0.59 MG/DL (ref 0.5–1.4)
EOSINOPHIL # BLD AUTO: 0.08 K/UL (ref 0–0.51)
EOSINOPHIL NFR BLD: 0.5 % (ref 0–6.9)
ERYTHROCYTE [DISTWIDTH] IN BLOOD BY AUTOMATED COUNT: 38.4 FL (ref 35.9–50)
GFR SERPLBLD CREATININE-BSD FMLA CKD-EPI: 124 ML/MIN/1.73 M 2
GLOBULIN SER CALC-MCNC: 3 G/DL (ref 1.9–3.5)
GLUCOSE SERPL-MCNC: 116 MG/DL (ref 65–99)
HCT VFR BLD AUTO: 33.7 % (ref 37–47)
HGB BLD-MCNC: 11.4 G/DL (ref 12–16)
IMM GRANULOCYTES # BLD AUTO: 0.08 K/UL (ref 0–0.11)
IMM GRANULOCYTES NFR BLD AUTO: 0.5 % (ref 0–0.9)
LACTATE SERPL-SCNC: 1.6 MMOL/L (ref 0.5–2)
LYMPHOCYTES # BLD AUTO: 1.76 K/UL (ref 1–4.8)
LYMPHOCYTES NFR BLD: 12 % (ref 22–41)
MAGNESIUM SERPL-MCNC: 2 MG/DL (ref 1.5–2.5)
MCH RBC QN AUTO: 28.4 PG (ref 27–33)
MCHC RBC AUTO-ENTMCNC: 33.8 G/DL (ref 32.2–35.5)
MCV RBC AUTO: 84 FL (ref 81.4–97.8)
MONOCYTES # BLD AUTO: 1.55 K/UL (ref 0–0.85)
MONOCYTES NFR BLD AUTO: 10.6 % (ref 0–13.4)
NEUTROPHILS # BLD AUTO: 11.11 K/UL (ref 1.82–7.42)
NEUTROPHILS NFR BLD: 76.2 % (ref 44–72)
NRBC # BLD AUTO: 0 K/UL
NRBC BLD-RTO: 0 /100 WBC (ref 0–0.2)
PHOSPHATE SERPL-MCNC: 2 MG/DL (ref 2.5–4.5)
PLATELET # BLD AUTO: 207 K/UL (ref 164–446)
PMV BLD AUTO: 10.3 FL (ref 9–12.9)
POTASSIUM SERPL-SCNC: 3.6 MMOL/L (ref 3.6–5.5)
PROT SERPL-MCNC: 6.1 G/DL (ref 6–8.2)
RBC # BLD AUTO: 4.01 M/UL (ref 4.2–5.4)
SODIUM SERPL-SCNC: 140 MMOL/L (ref 135–145)
WBC # BLD AUTO: 14.6 K/UL (ref 4.8–10.8)

## 2024-01-06 PROCEDURE — 700101 HCHG RX REV CODE 250: Performed by: INTERNAL MEDICINE

## 2024-01-06 PROCEDURE — 700101 HCHG RX REV CODE 250: Performed by: STUDENT IN AN ORGANIZED HEALTH CARE EDUCATION/TRAINING PROGRAM

## 2024-01-06 PROCEDURE — 770020 HCHG ROOM/CARE - TELE (206)

## 2024-01-06 PROCEDURE — 83735 ASSAY OF MAGNESIUM: CPT

## 2024-01-06 PROCEDURE — 99233 SBSQ HOSP IP/OBS HIGH 50: CPT | Performed by: INTERNAL MEDICINE

## 2024-01-06 PROCEDURE — 80053 COMPREHEN METABOLIC PANEL: CPT

## 2024-01-06 PROCEDURE — 700111 HCHG RX REV CODE 636 W/ 250 OVERRIDE (IP): Performed by: STUDENT IN AN ORGANIZED HEALTH CARE EDUCATION/TRAINING PROGRAM

## 2024-01-06 PROCEDURE — A9270 NON-COVERED ITEM OR SERVICE: HCPCS | Mod: JZ | Performed by: STUDENT IN AN ORGANIZED HEALTH CARE EDUCATION/TRAINING PROGRAM

## 2024-01-06 PROCEDURE — 700105 HCHG RX REV CODE 258: Performed by: INTERNAL MEDICINE

## 2024-01-06 PROCEDURE — 700102 HCHG RX REV CODE 250 W/ 637 OVERRIDE(OP): Mod: JZ | Performed by: STUDENT IN AN ORGANIZED HEALTH CARE EDUCATION/TRAINING PROGRAM

## 2024-01-06 PROCEDURE — 85025 COMPLETE CBC W/AUTO DIFF WBC: CPT

## 2024-01-06 PROCEDURE — 84100 ASSAY OF PHOSPHORUS: CPT

## 2024-01-06 PROCEDURE — 36415 COLL VENOUS BLD VENIPUNCTURE: CPT

## 2024-01-06 RX ADMIN — KETOROLAC TROMETHAMINE 15 MG: 30 INJECTION, SOLUTION INTRAMUSCULAR; INTRAVENOUS at 00:05

## 2024-01-06 RX ADMIN — CEFTRIAXONE SODIUM 2000 MG: 10 INJECTION, POWDER, FOR SOLUTION INTRAVENOUS at 18:03

## 2024-01-06 RX ADMIN — HEPARIN SODIUM 5000 UNITS: 5000 INJECTION, SOLUTION INTRAVENOUS; SUBCUTANEOUS at 04:48

## 2024-01-06 RX ADMIN — ONDANSETRON 4 MG: 2 INJECTION INTRAMUSCULAR; INTRAVENOUS at 04:49

## 2024-01-06 RX ADMIN — OXYCODONE HYDROCHLORIDE 5 MG: 5 TABLET ORAL at 12:26

## 2024-01-06 RX ADMIN — HEPARIN SODIUM 5000 UNITS: 5000 INJECTION, SOLUTION INTRAVENOUS; SUBCUTANEOUS at 13:40

## 2024-01-06 RX ADMIN — OXYCODONE HYDROCHLORIDE 5 MG: 5 TABLET ORAL at 21:01

## 2024-01-06 RX ADMIN — BENZONATATE 100 MG: 100 CAPSULE ORAL at 00:56

## 2024-01-06 RX ADMIN — KETOROLAC TROMETHAMINE 15 MG: 30 INJECTION, SOLUTION INTRAMUSCULAR; INTRAVENOUS at 04:48

## 2024-01-06 RX ADMIN — OXYCODONE HYDROCHLORIDE 5 MG: 5 TABLET ORAL at 07:45

## 2024-01-06 RX ADMIN — SODIUM PHOSPHATE, MONOBASIC, MONOHYDRATE AND SODIUM PHOSPHATE, DIBASIC, ANHYDROUS 15 MMOL: 142; 276 INJECTION, SOLUTION INTRAVENOUS at 13:37

## 2024-01-06 RX ADMIN — DOCUSATE SODIUM 50 MG AND SENNOSIDES 8.6 MG 2 TABLET: 8.6; 5 TABLET, FILM COATED ORAL at 18:02

## 2024-01-06 RX ADMIN — ACETAMINOPHEN 650 MG: 325 TABLET, FILM COATED ORAL at 23:26

## 2024-01-06 RX ADMIN — POTASSIUM CHLORIDE 40 MEQ: 1500 TABLET, EXTENDED RELEASE ORAL at 00:52

## 2024-01-06 RX ADMIN — POTASSIUM CHLORIDE AND SODIUM CHLORIDE: 900; 300 INJECTION, SOLUTION INTRAVENOUS at 00:53

## 2024-01-06 RX ADMIN — HEPARIN SODIUM 5000 UNITS: 5000 INJECTION, SOLUTION INTRAVENOUS; SUBCUTANEOUS at 21:01

## 2024-01-06 RX ADMIN — OXYCODONE HYDROCHLORIDE 5 MG: 5 TABLET ORAL at 18:02

## 2024-01-06 RX ADMIN — POTASSIUM CHLORIDE AND SODIUM CHLORIDE: 900; 300 INJECTION, SOLUTION INTRAVENOUS at 23:29

## 2024-01-06 RX ADMIN — POTASSIUM CHLORIDE AND SODIUM CHLORIDE: 900; 300 INJECTION, SOLUTION INTRAVENOUS at 07:46

## 2024-01-06 RX ADMIN — ACETAMINOPHEN 650 MG: 325 TABLET, FILM COATED ORAL at 00:07

## 2024-01-06 RX ADMIN — ACETAMINOPHEN 650 MG: 325 TABLET, FILM COATED ORAL at 04:48

## 2024-01-06 RX ADMIN — ACETAMINOPHEN 650 MG: 325 TABLET, FILM COATED ORAL at 12:26

## 2024-01-06 RX ADMIN — ACETAMINOPHEN 650 MG: 325 TABLET, FILM COATED ORAL at 18:03

## 2024-01-06 RX ADMIN — BENZONATATE 100 MG: 100 CAPSULE ORAL at 04:49

## 2024-01-06 ASSESSMENT — COGNITIVE AND FUNCTIONAL STATUS - GENERAL
DAILY ACTIVITIY SCORE: 24
SUGGESTED CMS G CODE MODIFIER DAILY ACTIVITY: CH

## 2024-01-06 ASSESSMENT — ENCOUNTER SYMPTOMS
CLAUDICATION: 0
BLURRED VISION: 0
MYALGIAS: 0
PHOTOPHOBIA: 0
CHILLS: 1
FEVER: 1
BACK PAIN: 1
ORTHOPNEA: 0
DIZZINESS: 0
DOUBLE VISION: 0
COUGH: 0
HEMOPTYSIS: 0
SPEECH CHANGE: 0
CONSTIPATION: 0
WEIGHT LOSS: 0
ABDOMINAL PAIN: 0
WEAKNESS: 0
DIARRHEA: 0
NAUSEA: 0
VOMITING: 0
NECK PAIN: 0
PALPITATIONS: 0

## 2024-01-06 ASSESSMENT — PAIN DESCRIPTION - PAIN TYPE
TYPE: ACUTE PAIN

## 2024-01-06 ASSESSMENT — LIFESTYLE VARIABLES
ON A TYPICAL DAY WHEN YOU DRINK ALCOHOL HOW MANY DRINKS DO YOU HAVE: 0
HAVE YOU EVER FELT YOU SHOULD CUT DOWN ON YOUR DRINKING: NO
EVER FELT BAD OR GUILTY ABOUT YOUR DRINKING: NO
HOW MANY TIMES IN THE PAST YEAR HAVE YOU HAD 5 OR MORE DRINKS IN A DAY: 0
AVERAGE NUMBER OF DAYS PER WEEK YOU HAVE A DRINK CONTAINING ALCOHOL: 0
EVER HAD A DRINK FIRST THING IN THE MORNING TO STEADY YOUR NERVES TO GET RID OF A HANGOVER: NO
TOTAL SCORE: 0
CONSUMPTION TOTAL: NEGATIVE
TOTAL SCORE: 0
TOTAL SCORE: 0
ALCOHOL_USE: NO
HAVE PEOPLE ANNOYED YOU BY CRITICIZING YOUR DRINKING: NO

## 2024-01-06 ASSESSMENT — PATIENT HEALTH QUESTIONNAIRE - PHQ9
1. LITTLE INTEREST OR PLEASURE IN DOING THINGS: NOT AT ALL
SUM OF ALL RESPONSES TO PHQ9 QUESTIONS 1 AND 2: 0
2. FEELING DOWN, DEPRESSED, IRRITABLE, OR HOPELESS: NOT AT ALL
2. FEELING DOWN, DEPRESSED, IRRITABLE, OR HOPELESS: NOT AT ALL
1. LITTLE INTEREST OR PLEASURE IN DOING THINGS: NOT AT ALL
SUM OF ALL RESPONSES TO PHQ9 QUESTIONS 1 AND 2: 0

## 2024-01-06 ASSESSMENT — FIBROSIS 4 INDEX: FIB4 SCORE: 0.7

## 2024-01-06 NOTE — CARE PLAN
The patient is Stable - Low risk of patient condition declining or worsening    Shift Goals  Clinical Goals: vss, monitor electrolytes, pain control, nausea control  Patient Goals: rest, pain control, nausea control    Progress made toward(s) clinical / shift goals:    Problem: Respiratory  Goal: Patient will achieve/maintain optimum respiratory ventilation and gas exchange  Description: Target End Date:  Prior to discharge or change in level of care    Document on Assessment flowsheet    1.  Assess and monitor rate, rhythm, depth and effort of respiration  2.  Breath sounds assessed qshift and/or as needed  3.  Assess O2 saturation, administer/titrate oxygen as ordered  4.  Position patient for maximum ventilatory efficiency  5.  Turn, cough, and deep breath with splinting to improve effectiveness  6.  Collaborate with RT to administer medication/treatments per order  7.  Encourage use of incentive spirometer and encourage patient to cough after use and utilize splinting techniques if applicable  8.  Airway suctioning  9.  Monitor sputum production for changes in color, consistency and frequency  10. Perform frequent oral hygiene  11. Alternate physical activity with rest periods  Outcome: Progressing     Problem: Pain - Standard  Goal: Alleviation of pain or a reduction in pain to the patient’s comfort goal  Description: Target End Date:  Prior to discharge or change in level of care    Document on Vitals flowsheet    1.  Document pain using the appropriate pain scale per order or unit policy  2.  Educate and implement non-pharmacologic comfort measures (i.e. relaxation, distraction, massage, cold/heat therapy, etc.)  3.  Pain management medications as ordered  4.  Reassess pain after pain med administration per policy  5.  If opiods administered assess patient's response to pain medication is appropriate per POSS sedation scale  6.  Follow pain management plan developed in collaboration with patient and  interdisciplinary team (including palliative care or pain specialists if applicable)  Outcome: Progressing     Problem: Knowledge Deficit - Standard  Goal: Patient and family/care givers will demonstrate understanding of plan of care, disease process/condition, diagnostic tests and medications  Description: Target End Date:  1-3 days or as soon as patient condition allows    Document in Patient Education    1.  Patient and family/caregiver oriented to unit, equipment, visitation policy and means for communicating concern  2.  Complete/review Learning Assessment  3.  Assess knowledge level of disease process/condition, treatment plan, diagnostic tests and medications  4.  Explain disease process/condition, treatment plan, diagnostic tests and medications  Outcome: Progressing       Patient is not progressing towards the following goals:

## 2024-01-06 NOTE — ED NOTES
Pt medicated according to MAR. Pt swabbed for repeat respiratory panel as the previous one was inconclusive. Lab at bedside for blood cultures.

## 2024-01-06 NOTE — PROGRESS NOTES
Assumed care on patient post bedside report. Alert and oriented x 4. On RA and breathing evenly and unlabored. Denies pain. Tele on and heart rhythm and rate verified with monitor tech. V/s checked. Skin assessment completed.     Oriented to unit and instructed the use of call light for assistance, light and tv control and placed within reach. Plan of care discussed briefly. V/s q 4 hours, replete electrolytes and monitor lab values, she verbalized understanding.    Safety precautions initiated are side rails up x 2, bed to lowest level, alarms on. Will round hourly and as needed.

## 2024-01-06 NOTE — H&P
Hospital Medicine History & Physical Note    Date of Service  1/5/2024    Primary Care Physician  Uyen Samaniego M.D.    Consultants  None    Code Status  Full Code    Chief Complaint  Chief Complaint   Patient presents with    Body Aches     X 2 days, went to urgent care 2 days ago, covid and flu negative     Vomiting     Starting today    Chills     X 2 days    Fever     101.1F in triage, started feeling feverish today       History of Presenting Illness  Bette Aguilar is a 28 y.o. female with history of asthma who presented 1/5/2024 with URI symptoms.  She was found to have hypokalemia and UA concerning for pyuria as well as left flank tenderness.  Therefore admission requested by ERP.  Admitted to medicine service.    I discussed the plan of care with patient, family, bedside RN, and pharmacy.    Review of Systems  Review of Systems   Constitutional:  Positive for chills, fever and malaise/fatigue.   HENT:  Negative for hearing loss and tinnitus.    Eyes:  Negative for blurred vision and double vision.   Respiratory:  Positive for cough and shortness of breath. Negative for sputum production.    Cardiovascular:  Positive for palpitations. Negative for chest pain.   Gastrointestinal:  Positive for nausea. Negative for abdominal pain, heartburn and vomiting.   Genitourinary:  Positive for dysuria, flank pain, frequency and urgency. Negative for hematuria.   Musculoskeletal:  Positive for myalgias. Negative for joint pain.   Skin:  Negative for itching and rash.   Neurological:  Positive for weakness. Negative for dizziness and headaches.   Endo/Heme/Allergies:  Negative for environmental allergies. Does not bruise/bleed easily.   Psychiatric/Behavioral:  Negative for depression and substance abuse.    All other systems reviewed and are negative.      Past Medical History   has a past medical history of Asthma and Psychiatric problem.    Surgical History   has a past surgical history that includes other and  primary c section.     Family History  family history includes Diabetes in her mother; Hypertension in her maternal grandmother and mother; No Known Problems in her father.   Family history reviewed with patient. There is no family history that is pertinent to the chief complaint.     Social History   reports that she has never smoked. She has been exposed to tobacco smoke. She has never used smokeless tobacco. She reports that she does not drink alcohol and does not use drugs.    Allergies  Allergies   Allergen Reactions    Peanut-Derived Rash     Redness and dryness affecting face    Sagebrush Runny Nose             Medications  Prior to Admission Medications   Prescriptions Last Dose Informant Patient Reported? Taking?   albuterol 108 (90 Base) MCG/ACT Aero Soln inhalation aerosol   No No   Sig: Inhale 2 Puffs every 6 hours as needed for Shortness of Breath.   benzonatate (TESSALON) 100 MG Cap   No No   Sig: Take 1 Capsule by mouth 3 times a day as needed for Cough.   ethinyl estradiol-etonogestrel (NUVARING) 0.12-0.015 MG/24HR vaginal ring  Patient Yes No   loratadine (CLARITIN) 10 MG Tab  Patient Yes No   Sig: Take 10 mg by mouth every day.   multivitamin Tab  Patient Yes No   Sig: Take 1 Tablet by mouth every day.      Facility-Administered Medications: None       Physical Exam  Temp:  [36.9 °C (98.4 °F)-39 °C (102.2 °F)] 36.9 °C (98.4 °F)  Pulse:  [] 93  Resp:  [12-16] 14  BP: (111-117)/(62-67) 114/62  SpO2:  [96 %-98 %] 96 %  Blood Pressure: 111/63   Temperature: (!) 39 °C (102.2 °F)   Pulse: 93   Respiration: 12   Pulse Oximetry: 96 %       Physical Exam  Vitals and nursing note reviewed.   Constitutional:       Appearance: She is obese.   HENT:      Head: Normocephalic and atraumatic.      Nose: Nose normal.      Mouth/Throat:      Mouth: Mucous membranes are dry.      Pharynx: Oropharynx is clear.   Eyes:      General: No scleral icterus.     Extraocular Movements: Extraocular movements intact.  "  Cardiovascular:      Rate and Rhythm: Regular rhythm. Tachycardia present.      Pulses: Normal pulses.      Heart sounds:      No friction rub.   Pulmonary:      Effort: No respiratory distress.      Breath sounds: No wheezing or rales.   Chest:      Chest wall: No tenderness.   Abdominal:      General: There is distension.      Tenderness: There is no abdominal tenderness. There is no guarding or rebound.   Genitourinary:     Comments: +left CVA tenderness  Musculoskeletal:      Cervical back: Neck supple. No tenderness.      Right lower leg: No edema.      Left lower leg: No edema.   Skin:     General: Skin is warm and dry.      Capillary Refill: Capillary refill takes less than 2 seconds.   Neurological:      General: No focal deficit present.      Mental Status: She is alert and oriented to person, place, and time.   Psychiatric:         Mood and Affect: Mood normal.         Laboratory:  Recent Labs     01/05/24  1538   WBC 14.3*   RBC 4.48   HEMOGLOBIN 12.9   HEMATOCRIT 36.8*   MCV 82.1   MCH 28.8   MCHC 35.1   RDW 37.5   PLATELETCT 212   MPV 10.2     Recent Labs     01/05/24  1538   SODIUM 139   POTASSIUM 2.6*   CHLORIDE 102   CO2 20   GLUCOSE 139*   BUN 7*   CREATININE 0.68   CALCIUM 8.0*     Recent Labs     01/05/24  1538   ALTSGPT 18   ASTSGOT 21   ALKPHOSPHAT 107*   TBILIRUBIN 0.6   GLUCOSE 139*         No results for input(s): \"NTPROBNP\" in the last 72 hours.      No results for input(s): \"TROPONINT\" in the last 72 hours.    Imaging:  DX-CHEST-PORTABLE (1 VIEW)   Final Result      No acute cardiopulmonary abnormality.          X-Ray:  I have personally reviewed the images and compared with prior images.  EKG:  I have personally reviewed the images and compared with prior images.    Assessment/Plan:  Justification for Admission Status  I anticipate this patient will require at least 2 midnights hospitalization, therefore appropriate for inpatient status.        * Acute pyelonephritis  Assessment & " Plan  Suspected  IVF  Supportive pain control  Follow cultures  Antibiotic: Ceftriaxone    SIRS (systemic inflammatory response syndrome) (HCC)  Assessment & Plan  SIRS criteria identified on my evaluation include:  Fever, with temperature greater than 100.9 deg F, Tachycardia, with heart rate greater than 90 BPM, Leukocytosis, with WBC greater than 12,000, and Bandemia, greater than 10% bands  SIRS is non-infectious, the patient does not have sepsis      Acute febrile illness  Assessment & Plan  Likely due to above    Hypokalemia- (present on admission)  Assessment & Plan  Replace - PO+IV  K 2.6  Repeat lab in AM  Replace Mg    Mild intermittent asthma without complication- (present on admission)  Assessment & Plan  Not in acute exacerbation  As needed nebs    Obesity (BMI 30-39.9)- (present on admission)  Assessment & Plan  Diet and lifestyle modification        VTE prophylaxis: heparin ppx

## 2024-01-06 NOTE — ED PROVIDER NOTES
"ED PHYSICIAN NOTE    CHIEF COMPLAINT  Chief Complaint   Patient presents with    Body Aches     X 2 days, went to urgent care 2 days ago, covid and flu negative     Vomiting     Starting today    Chills     X 2 days    Fever     101.1F in triage, started feeling feverish today       EXTERNAL RECORDS REVIEWED  Outpatient Notes patient seen in urgent care 1/3/2024.  She was evaluated for cough, congestion, runny nose.  She was tested for COVID flu and RSV.  These test were negative    HPI/ROS    Bette Aguilar is a 28 y.o. female who presents feeling unwell.  She started getting sick about 3 days ago.  Has had cough, congestion, runny nose sore throat.  Has had bodyaches chills.  Has vomited several times.  No diarrhea.  Denies dysuria hematuria frequency.  No flank pain abdominal pain.  Denies a rash.  No neck stiffness.  No known ill contacts.    PAST MEDICAL HISTORY  Past Medical History:   Diagnosis Date    Asthma     Psychiatric problem     depression in the past       SOCIAL HISTORY  Social History     Tobacco Use    Smoking status: Never     Passive exposure: Past    Smokeless tobacco: Never   Vaping Use    Vaping Use: Never used   Substance Use Topics    Alcohol use: No    Drug use: No       CURRENT MEDICATIONS  Home Medications       Reviewed by Ramu Vines R.N. (Registered Nurse) on 01/05/24 at 1412  Med List Status: Not Addressed     Medication Last Dose Status   albuterol 108 (90 Base) MCG/ACT Aero Soln inhalation aerosol  Active   benzonatate (TESSALON) 100 MG Cap  Active   ethinyl estradiol-etonogestrel (NUVARING) 0.12-0.015 MG/24HR vaginal ring  Active   loratadine (CLARITIN) 10 MG Tab  Active   multivitamin Tab  Active                    ALLERGIES  Allergies   Allergen Reactions    Other Food      Peanuts - face gets really red and dry    Sagebrush        PHYSICAL EXAM  VITAL SIGNS: /67   Pulse (!) 110   Temp (!) 39 °C (102.2 °F) (Oral)   Resp 16   Ht 1.575 m (5' 2\")   Wt 83.9 kg " (185 lb)   LMP 11/12/2023 (Approximate)   SpO2 98%   BMI 33.84 kg/m²    Constitutional: Awake and alert.  She feels hot  HENT: Normal inspection  Eyes: Normal inspection  Neck: Grossly normal range of motion.  Cardiovascular: Elevated heart rate, Normal rhythm.  Symmetric peripheral pulses.   Thorax & Lungs: No respiratory distress, No wheezing, No rales, No rhonchi, No chest tenderness.   Abdomen: Bowel sounds normal, soft, non-distended, nontender, no mass  Skin: Warm and diaphoretic  Back: No tenderness, No CVA tenderness.   Extremities: No clubbing, cyanosis, edema, no Homans or cords.  Neurologic: Grossly normal       DIAGNOSTIC STUDIES / PROCEDURES  LABS/EKG  Results for orders placed or performed during the hospital encounter of 01/05/24   CBC WITH DIFFERENTIAL   Result Value Ref Range    WBC 14.3 (H) 4.8 - 10.8 K/uL    RBC 4.48 4.20 - 5.40 M/uL    Hemoglobin 12.9 12.0 - 16.0 g/dL    Hematocrit 36.8 (L) 37.0 - 47.0 %    MCV 82.1 81.4 - 97.8 fL    MCH 28.8 27.0 - 33.0 pg    MCHC 35.1 32.2 - 35.5 g/dL    RDW 37.5 35.9 - 50.0 fL    Platelet Count 212 164 - 446 K/uL    MPV 10.2 9.0 - 12.9 fL    Neutrophils-Polys 84.40 (H) 44.00 - 72.00 %    Lymphocytes 4.40 (L) 22.00 - 41.00 %    Monocytes 10.20 0.00 - 13.40 %    Eosinophils 0.10 0.00 - 6.90 %    Basophils 0.30 0.00 - 1.80 %    Immature Granulocytes 0.60 0.00 - 0.90 %    Nucleated RBC 0.00 0.00 - 0.20 /100 WBC    Neutrophils (Absolute) 12.06 (H) 1.82 - 7.42 K/uL    Lymphs (Absolute) 0.63 (L) 1.00 - 4.80 K/uL    Monos (Absolute) 1.46 (H) 0.00 - 0.85 K/uL    Eos (Absolute) 0.01 0.00 - 0.51 K/uL    Baso (Absolute) 0.04 0.00 - 0.12 K/uL    Immature Granulocytes (abs) 0.09 0.00 - 0.11 K/uL    NRBC (Absolute) 0.00 K/uL   COMP METABOLIC PANEL   Result Value Ref Range    Sodium 139 135 - 145 mmol/L    Potassium 2.6 (LL) 3.6 - 5.5 mmol/L    Chloride 102 96 - 112 mmol/L    Co2 20 20 - 33 mmol/L    Anion Gap 17.0 (H) 7.0 - 16.0    Glucose 139 (H) 65 - 99 mg/dL    Bun 7  (L) 8 - 22 mg/dL    Creatinine 0.68 0.50 - 1.40 mg/dL    Calcium 8.0 (L) 8.5 - 10.5 mg/dL    Correct Calcium 8.4 (L) 8.5 - 10.5 mg/dL    AST(SGOT) 21 12 - 45 U/L    ALT(SGPT) 18 2 - 50 U/L    Alkaline Phosphatase 107 (H) 30 - 99 U/L    Total Bilirubin 0.6 0.1 - 1.5 mg/dL    Albumin 3.5 3.2 - 4.9 g/dL    Total Protein 7.0 6.0 - 8.2 g/dL    Globulin 3.5 1.9 - 3.5 g/dL    A-G Ratio 1.0 g/dL   URINALYSIS CULTURE, IF INDICATED    Specimen: Urine, Clean Catch   Result Value Ref Range    Color Yellow     Character Cloudy (A)     Specific Gravity 1.015 <1.035    Ph 6.0 5.0 - 8.0    Glucose Negative Negative mg/dL    Ketones Negative Negative mg/dL    Protein 30 (A) Negative mg/dL    Bilirubin Negative Negative    Urobilinogen, Urine 4.0 Negative    Nitrite Positive (A) Negative    Leukocyte Esterase Large (A) Negative    Occult Blood Moderate (A) Negative    Micro Urine Req Microscopic    HCG QUAL SERUM   Result Value Ref Range    Beta-Hcg Qualitative Serum Negative Negative   ESTIMATED GFR   Result Value Ref Range    GFR (CKD-EPI) 119 >60 mL/min/1.73 m 2   URINE MICROSCOPIC (W/UA)   Result Value Ref Range    WBC 20-50 (A) /hpf    RBC 5-10 (A) /hpf    Bacteria Moderate (A) None /hpf    Epithelial Cells Few /hpf    Epithelial Cells Renal Negative /hpf   MAGNESIUM   Result Value Ref Range    Magnesium 1.5 1.5 - 2.5 mg/dL   EKG (NOW)   Result Value Ref Range    Report       [facility]    Test Date:  2024  Pt Name:    JARAD KARIMI               Department: ER  MRN:        [id number]                      Room:       UT 73  Gender:     Female                       Technician: [id number]  :        1995                   Requested By:CHRISTIANO HARO  Order #:    [id number]                    Reading MD: CHRISTIANO HARO MD    Measurements  Intervals                                Axis  Rate:       87                           P:          44  UT:         144                          QRS:         50  QRSD:       84                           T:          6  QT:         463  QTc:        557    Interpretive Statements  Sinus rhythm  Borderline repolarization abnormality  Prolonged QT interval  Baseline wander in lead(s) V1,V2,V3,V4,V5,V6  Compared to ECG 01/16/2017 00:49:16  Prolonged QT interval now present  Electronically Signed On 01- 17:53:17 PST by CHRISTIANO HARO MD     POC CoV-2, FLU A/B, RSV by PCR   Result Value Ref Range    POC Influenza A RNA, PCR Negative Negative    POC Influenza B RNA, PCR Negative Negative    POC RSV, by PCR Negative Negative    POC SARS-CoV-2, PCR NotDetected       I have independently interpreted this EKG as documented above  Rhythm strip interpretation-sinus rhythm    RADIOLOGY  I have independently interpreted the diagnostic imaging associated with this visit and am waiting the final reading from the radiologist.   My preliminary interpretation is as follows: Chest x-ray without infiltrate      COURSE & MEDICAL DECISION MAKING    INITIAL ASSESSMENT, COURSE AND PLAN  Care Narrative: Patient presents with with fever.  She has viral URI symptoms with high fever.  She appears clinically unwell and therefore obtained laboratory data and x-ray.  Hydrate with IV fluids.  Treat fever with Tylenol.    Viral panel is negative.  Chest x-ray negative for infiltrate.    She has significant leukocytosis WBC count of 14,000.  CMP returned with hypokalemia.  Add magnesium.  EKG was obtained showing prolonged QT interval.  Ordered KCl IV as well as KCl p.o.    Urinalysis returned positive with nitrites.  Upon results of urine at around 4 PM bacterial infection was suspected.  Sepsis protocols initiated.  Ordered ceftriaxone.    Patient will need to be admitted to hospital.  Paged hospitalist for admission.      DISPOSITION AND DISCUSSIONS  I have discussed management of the patient with the following physicians and SRAVANI's: Dr. Yu hospitalist    FINAL IMPRESSION  1.  Urinary  tract infection  2.  Hypokalemia    This dictation was created using voice recognition software. The accuracy of the dictation is limited to the abilities of the software. I expect there may be some errors of grammar and possibly content. The nursing notes were reviewed and certain aspects of this information were incorporated into this note.    Electronically signed by: Karan Navas M.D., 1/5/2024

## 2024-01-06 NOTE — ASSESSMENT & PLAN NOTE
SIRS criteria identified on my evaluation include:  Fever, with temperature greater than 100.9 deg F, Tachycardia, with heart rate greater than 90 BPM, Leukocytosis, with WBC greater than 12,000, and Bandemia, greater than 10% bands  SIRS is non-infectious, the patient does not have sepsis

## 2024-01-06 NOTE — PROGRESS NOTES
Bedside report received, assumed care of patient. Resting in bed, denied pain. A&O x4. Tele monitoring in place. Educated on fall risk, all fall precautions in place. Call light within reach, bed locked and in lowest position, denied other needs at this time.

## 2024-01-06 NOTE — PROGRESS NOTES
Hospital Medicine Daily Progress Note    Date of Service  1/6/2024    Chief Complaint  Bette Aguilar is a 28 y.o. female admitted 1/5/2024 with fever     Hospital Course  28-year-old female with no significant history except asthma presented 1/5 with coughing and fever.  Patient has had symptoms with coughing, dry not producing sputum, fever and chills with generalized weakness.  Patient denied any dysuria or urinary symptoms however she has lower back pain, and left flank pain.  In admission patient had fever 101 with tachycardia, also labs showed leukocytosis 14.3 with potassium 2.6 and normal kidney function with normal lactic acid.  Chest x-ray did not show any significant infiltration or pneumonia, influenza A and COVID test were negative.  UA showed white blood cell, blood culture is negative.  Ceftriaxone was initiated for possible pyelonephritis with improving on her symptoms.    Interval Problem Update  -Evaluated examined the patient at bedside, patient feels better, no significant pain today with no fever  -Continue ceftriaxone for course of antibiotics, switch to oral antibiotics tomorrow  -Improving on her potassium, replace phosphorus  -Continue IV fluid    I have discussed this patient's plan of care and discharge plan at IDT rounds today with Case Management, Nursing, Nursing leadership, and other members of the IDT team.    Consultants/Specialty  None    Code Status  Full Code    Disposition  The patient is not medically cleared for discharge to home or a post-acute facility.  Anticipate discharge to: home with close outpatient follow-up    I have placed the appropriate orders for post-discharge needs.    Review of Systems  Review of Systems   Constitutional:  Positive for chills, fever and malaise/fatigue. Negative for weight loss.   HENT:  Negative for ear pain, hearing loss and tinnitus.    Eyes:  Negative for blurred vision, double vision and photophobia.   Respiratory:  Negative for cough  and hemoptysis.    Cardiovascular:  Negative for chest pain, palpitations, orthopnea and claudication.   Gastrointestinal:  Negative for abdominal pain, constipation, diarrhea, nausea and vomiting.   Genitourinary:  Negative for dysuria, frequency and urgency.   Musculoskeletal:  Positive for back pain. Negative for myalgias and neck pain.   Skin:  Negative for rash.   Neurological:  Negative for dizziness, speech change and weakness.        Physical Exam  Temp:  [36.7 °C (98.1 °F)-39 °C (102.2 °F)] 36.7 °C (98.1 °F)  Pulse:  [] 86  Resp:  [12-18] 16  BP: (102-140)/(61-72) 102/66  SpO2:  [95 %-98 %] 95 %    Physical Exam  Constitutional:       General: She is not in acute distress.     Appearance: She is obese. She is ill-appearing.   HENT:      Head: Normocephalic and atraumatic.   Cardiovascular:      Rate and Rhythm: Normal rate.      Heart sounds: No murmur heard.  Pulmonary:      Effort: No respiratory distress.      Breath sounds: No wheezing.   Abdominal:      General: There is no distension.      Tenderness: There is no abdominal tenderness. There is no guarding.   Musculoskeletal:      Right lower leg: No edema.      Left lower leg: No edema.   Skin:     Coloration: Skin is not pale.      Findings: No bruising, lesion or rash.   Neurological:      General: No focal deficit present.      Mental Status: She is oriented to person, place, and time. Mental status is at baseline.      Cranial Nerves: No cranial nerve deficit.      Motor: No weakness.         Fluids    Intake/Output Summary (Last 24 hours) at 1/6/2024 1203  Last data filed at 1/6/2024 0853  Gross per 24 hour   Intake 820 ml   Output 0 ml   Net 820 ml       Laboratory  Recent Labs     01/05/24  1538 01/06/24  0439   WBC 14.3* 14.6*   RBC 4.48 4.01*   HEMOGLOBIN 12.9 11.4*   HEMATOCRIT 36.8* 33.7*   MCV 82.1 84.0   MCH 28.8 28.4   MCHC 35.1 33.8   RDW 37.5 38.4   PLATELETCT 212 207   MPV 10.2 10.3     Recent Labs     01/05/24  1535  01/06/24  0439   SODIUM 139 140   POTASSIUM 2.6* 3.6   CHLORIDE 102 106   CO2 20 22   GLUCOSE 139* 116*   BUN 7* 7*   CREATININE 0.68 0.59   CALCIUM 8.0* 7.5*                   Imaging  DX-CHEST-PORTABLE (1 VIEW)   Final Result      No acute cardiopulmonary abnormality.           Assessment/Plan  * Acute pyelonephritis  Assessment & Plan  Possible pyelonephritis due to flank pain with fever and white blood cells in urine  Patient did not have any dysuria or urinary frequency  Continue ceftriaxone course of antibiotics  Blood culture and urine culture  IV fluid  Improving    Acute febrile illness  Assessment & Plan  Likely due to above    Hypokalemia- (present on admission)  Assessment & Plan  Likely related to vomiting  Replace, improving  Labs daily    Obesity (BMI 30-39.9)- (present on admission)  Assessment & Plan  Diet and lifestyle modification    Mild intermittent asthma without complication- (present on admission)  Assessment & Plan  Not in acute exacerbation  As needed nebs    SIRS (systemic inflammatory response syndrome) (HCC)  Assessment & Plan  SIRS criteria identified on my evaluation include:  Fever, with temperature greater than 100.9 deg F, Tachycardia, with heart rate greater than 90 BPM, Leukocytosis, with WBC greater than 12,000, and Bandemia, greater than 10% bands  SIRS is non-infectious, the patient does not have sepsis           VTE prophylaxis:   SCDs/TEDs   enoxaparin ppx      I have performed a physical exam and reviewed and updated ROS and Plan today (1/6/2024). In review of yesterday's note (1/5/2024), there are no changes except as documented above.      Greater than 51 minutes spent prepping to see patient (e.g. review of tests) obtaining and/or reviewing separately obtained history. Performing a medically appropriate examination and/ evaluation.  Counseling and educating the patient/family/caregiver.  Ordering medications, tests, or procedures.  Referring and communicating with other  health care professionals.  Documenting clinical information in EPIC.  Independently interpreting results and communicating results to patient/family/caregiver.  Care coordination

## 2024-01-06 NOTE — CARE PLAN
The patient is Stable - Low risk of patient condition declining or worsening    Shift Goals  Clinical Goals: vss, monitor electrolyes and replete  Patient Goals: rest    Progress made toward(s) clinical / shift goals:        Patient is not progressing towards the following goals:      Problem: Hemodynamics  Goal: Patient's hemodynamics, fluid balance and neurologic status will be stable or improve  Description: Target End Date:  Prior to discharge or change in level of care    Document on Assessment and I/O flowsheet templates    1.  Monitor vital signs, pulse oximetry and cardiac monitor per provider order and/or policy  2.  Maintain blood pressure per provider order  3.  Hemodynamic monitoring per provider order  4.  Manage IV fluids and IV infusions  5.  Monitor intake and output  6.  Daily weights per unit policy or provider order  7.  Assess peripheral pulses and capillary refill  8.  Assess color and body temperature  9.  Position patient for maximum circulation/cardiac output  10. Monitor for signs/symptoms of excessive bleeding  11. Assess mental status, restlessness and changes in level of consciousness  12. Monitor temperature and report fever or hypothermia to provider immediately. Consideration of targeted temperature management.  1/6/2024 0131 by Lilli Park R.N.  Outcome: Not Progressing  1/6/2024 0058 by Lilli Park RAMY.  Outcome: Progressing     Problem: Physical Regulation  Goal: Diagnostic test results will improve  Description: Target End Date:  Prior to discharge or change in level of care    1.  Monitor lactic acid levels  2.  Monitor ABG's  3.  Monitor diagnostic test results  Outcome: Not Progressing  Goal: Signs and symptoms of infection will decrease  Description: Target End Date:  Prior to discharge or change in level of care    1.  Remove potential routes of infection, such as central lines and urinary catheter  2.  Follow facility protocol for changing IV tubing and sites  3.   Collaborate with Infectious Disease  4.  Antibiotic therapy per provider order  5.  Note drug effects and monitor for antibiotic toxicity  Outcome: Not Progressing  Note: Monitoring lab values     Problem: Pain - Standard  Goal: Alleviation of pain or a reduction in pain to the patient’s comfort goal  Description: Target End Date:  Prior to discharge or change in level of care    Document on Vitals flowsheet    1.  Document pain using the appropriate pain scale per order or unit policy  2.  Educate and implement non-pharmacologic comfort measures (i.e. relaxation, distraction, massage, cold/heat therapy, etc.)  3.  Pain management medications as ordered  4.  Reassess pain after pain med administration per policy  5.  If opiods administered assess patient's response to pain medication is appropriate per POSS sedation scale  6.  Follow pain management plan developed in collaboration with patient and interdisciplinary team (including palliative care or pain specialists if applicable)  Outcome: Not Progressing

## 2024-01-06 NOTE — DISCHARGE INSTRUCTIONS
E. Coli Infection  E. coli (Escherichia coli) are bacteria that can cause an infection in different parts of your body, including your intestines. E. coli bacteria normally live in the intestines of people and animals. Most types of E. coli do not cause infections, but some produce a poison (toxin) that can cause diarrhea. Depending on the toxin, this can cause mild or severe diarrhea.  This condition is contagious. This means that it can spread from person to person. It can also spread from animals to humans. Most cases of E. coli infection come from cows (cattle).  In some cases, this infection can cause a dangerous complication called hemolytic uremic syndrome (HUS). HUS leads to blood cell abnormalities and kidney failure.  What are the causes?  This condition is caused by E. coli bacteria. You may get this bacteria by:  Eating raw or undercooked beef.  Touching an infected animal and then touching your mouth.  Eating raw fruits or vegetables that have come into contact with the stool (feces) of infected animals.  Drinking fluids that have been contaminated with E. coli from infected animals.  Coming into contact with a surface that has been contaminated by an infected person.  What increases the risk?  This condition is more likely to develop in people who:  Are young children or older adults.  Eat raw or undercooked beef.  Drink raw (unpasteurized) milk, cider, or juice.  Eat cheeses made from unpasteurized milk.  Eat raw vegetables such as spinach or lettuce.  Are in close contact with cattle, goats, or sheep.  Have a weak body defense system (immune system).  What are the signs or symptoms?  Symptoms of this condition usually start 3-4 days after the bacteria were swallowed (ingested). Symptoms include:  Severe cramps and tenderness in the abdomen.  Diarrhea. This may be watery or bloody.  Nausea and vomiting.  Dehydration. This can cause fatigue, thirst, a dry mouth, and less frequent urination.  Low fever.  This is not common.  How is this diagnosed?  This condition may be diagnosed based on:  A medical history.  A physical exam.  A stool culture. This involves testing a sample of your stool for E. coli or toxins of E. coli.  How is this treated?  Treatment for this condition includes rest and fluids (supportive care). If you have severe diarrhea, you may need to receive fluids through an IV. Symptoms of E. coli intestinal infection usually go away in 5-10 days.  Some strains of E. coli may be treated with antibiotic or antidiarrheal medicines. However, these medicines are rarely given because they increase your risk for HUS.  Follow these instructions at home:  Eating and drinking         Drink enough fluid to keep your urine pale yellow. You may need to drink small amounts of clear liquids frequently.  Take an oral rehydration solution (ORS) as told by your health care provider. This drink is sold at pharmacies and retail stores.  Drink clear fluids, such as water, ice chips, diluted fruit juice, and low-calorie sports drinks.  Eat bland, easy-to-digest foods in small amounts as you are able. These foods include bananas, applesauce, rice, lean meats, toast, and crackers.  Eat small, frequent meals rather than large meals.  Do not drink milk, caffeine, or alcohol.  Food safety  Do not eat:  Raw or undercooked beef.  Cheese that was made with unpasteurized milk.  Do not drink:  Unpasteurized milk.  Unpasteurized apple cider.  Wash cutting boards, counters, and utensils with hot, soapy water after you prepare raw meat.  Wash all fruits and vegetables before you eat or cook them.  General instructions    Take over-the-counter and prescription medicines only as told by your health care provider.  Wash your hands thoroughly with soap and water for at least 20 seconds:  Before and after you prepare food.  After you use the bathroom.  Before you eat.  After touching animals, especially cattle.  After caring for an ill  person.  Make sure people who live with you also wash their hands often. If soap and water are not available, use alcohol-based hand .  Clean surfaces that you touch with a product that contains chlorine bleach.  Keep all follow-up visits. This is important.  Contact a health care provider if:  Your symptoms do not get better or get worse.  You have new symptoms.  Get help right away if you:  Have increasing pain or tenderness in your abdomen.  Have ongoing (persistent) vomiting or diarrhea.  Have abdominal pain that stays in one area (localizes).  Have diarrhea with more blood in it.  Have a fever.  Cannot eat or drink without vomiting.  Have signs of dehydration or HUS, such as:  Pale skin.  Dark urine, very little urine, or no urine.  Cracked lips.  Not making tears while crying.  Dry mouth.  Sunken eyes.  Sleepiness.  Weakness.  Dizziness.  Summary  E. coli are bacteria that can cause an infection in different parts of your body, including your intestines. Most types of E. coli do not cause infections, but some produce a toxin that can cause diarrhea.  Treatment for this condition includes rest and fluids. If you have severe diarrhea, you may need to receive fluids through an IV.  Symptoms of E. coli intestinal infection usually go away in 5-10 days.  Follow your health care provider's instructions about medicines, eating and drinking, food safety and general hygiene, and when to call for help.  This information is not intended to replace advice given to you by your health care provider. Make sure you discuss any questions you have with your health care provider.  Document Revised: 07/01/2022 Document Reviewed: 07/01/2022  [employer] Patient Education © 2023 [employer]  Pyelonephritis, Adult  Pyelonephritis is an infection that occurs in the kidney. The kidneys are the organs that filter a person's blood and move waste out of the bloodstream and into the urine. Urine passes from the kidneys, through  tubes called ureters, and into the bladder. There are two main types of pyelonephritis:  Infections that come on quickly without any warning (acute pyelonephritis).  Infections that last for a long period of time (chronic pyelonephritis).  In most cases, the infection clears up with treatment and does not cause further problems. More severe infections or chronic infections can sometimes spread to the bloodstream or lead to other problems with the kidneys.  What are the causes?  This condition is usually caused by:  Bacteria traveling from the bladder up to the kidney. This may occur after having a bladder infection (cystitis) or urinary tract infection (UTI).  Bladder infections caused from bacteria traveling from the bloodstream to the kidney.  What increases the risk?  This condition is more likely to develop in:  Pregnant women.  Older people.  People who have any of these conditions:  Diabetes.  Inflammation of the prostate gland (prostatitis), in males.  Kidney stones or bladder stones.  Other abnormalities of the kidney or ureter.  Cancer.  People who have a catheter placed in the bladder.  People who are sexually active.  Women who use spermicides.  People who have had a prior UTI.  What are the signs or symptoms?  Symptoms of this condition include:  Frequent urination.  Strong or persistent urge to urinate.  Burning or stinging when urinating.  Abdominal pain.  Back pain.  Pain in the side or flank area.  Fever or chills.  Blood in the urine, or dark urine.  Nausea or vomiting.  How is this diagnosed?  This condition may be diagnosed based on:  Your medical history and a physical exam.  Urine tests.  Blood tests.  You may also have imaging tests of the kidneys, such as an ultrasound or CT scan.  How is this treated?  Treatment for this condition may depend on the severity of the infection.  If the infection is mild and is found early, you may be treated with antibiotic medicines taken by mouth (orally). You  will need to drink fluids to remain hydrated.  If the infection is more severe, you may need to stay in the hospital and receive antibiotics given directly into a vein through an IV. You may also need to receive fluids through an IV if you are not able to remain hydrated. After your hospital stay, you may need to take oral antibiotics for a period of time.  Other treatments may be required, depending on the cause of the infection.  Follow these instructions at home:  Medicines  Take your antibiotic medicine as told by your health care provider. Do not stop taking the antibiotic even if you start to feel better.  Take over-the-counter and prescription medicines only as told by your health care provider.  General instructions    Drink enough fluid to keep your urine pale yellow.  Avoid caffeine, tea, and carbonated beverages. They tend to irritate the bladder.  Urinate often. Avoid holding in urine for long periods of time.  Urinate before and after sex.  After a bowel movement, women should cleanse from front to back. Use each tissue only once.  Keep all follow-up visits as told by your health care provider. This is important.  Contact a health care provider if:  Your symptoms do not get better after 2 days of treatment.  Your symptoms get worse.  You have a fever.  Get help right away if you:  Are unable to take your antibiotics or fluids.  Have shaking chills.  Vomit.  Have severe flank or back pain.  Have extreme weakness or fainting.  Summary  Pyelonephritis is a urinary tract infection (UTI) that occurs in the kidney.  Treatment for this condition may depend on the severity of the infection.  Take your antibiotic medicine as told by your health care provider. Do not stop taking the antibiotic even if you start to feel better.  Drink enough fluid to keep your urine pale yellow.  Keep all follow-up visits as told by your health care provider. This is important.  This information is not intended to replace advice  given to you by your health care provider. Make sure you discuss any questions you have with your health care provider.  Document Revised: 07/28/2022 Document Reviewed: 07/28/2022  [employer] Patient Education © 2023 [employer]

## 2024-01-06 NOTE — HOSPITAL COURSE
28-year-old female with no significant history except asthma presented 1/5 with coughing and fever.  Patient has had symptoms with coughing, dry not producing sputum, fever and chills with generalized weakness.  Patient denied any dysuria or urinary symptoms however she has lower back pain, and left flank pain.  In admission patient had fever 101 with tachycardia, also labs showed leukocytosis 14.3 with potassium 2.6 and normal kidney function with normal lactic acid.  Chest x-ray did not show any significant infiltration or pneumonia, influenza A and COVID test were negative.  UA showed white blood cell, blood culture is negative.  Ceftriaxone was initiated for possible pyelonephritis with improving on her symptoms.

## 2024-01-06 NOTE — ED NOTES
Med rec completed per patient at bedside.  Allergies reviewed with patient.  Patient's preferred pharmacy: [facility].    ANTICOAGULATION: NONE.    Outpatient antibiotics within the last 30 days: NONE.    Patient states that she has been using Advil every 6 hours as needed for pain since Tuesday. Per patient she started at Advil 200 mg x 3 tablets per dose and increased up to 5 tablets per dose, which she has been doing since Wednesday, due to unrelieved pain.    Josef Black, PhT

## 2024-01-06 NOTE — PROGRESS NOTES
4 Eyes Skin Assessment Completed by VIANNEY Duenas and VIANNEY Reeder.    Head WDL  Ears WDL  Nose WDL  Mouth WDL  Neck WDL  Breast/Chest WDL  Shoulder Blades WDL  Spine WDL  (R) Arm/Elbow/Hand WDL  (L) Arm/Elbow/Hand WDL  Abdomen WDL  Groin WDL  Scrotum/Coccyx/Buttocks WDL  (R) Leg WDL  (L) Leg WDL  (R) Heel/Foot/Toe WDL  (L) Heel/Foot/Toe WDL          Devices In Places Tele Box      Interventions In Place Pillows    Possible Skin Injury No    Pictures Uploaded Into Epic N/A  Wound Consult Placed N/A  RN Wound Prevention Protocol Ordered No

## 2024-01-06 NOTE — ASSESSMENT & PLAN NOTE
Possible pyelonephritis due to flank pain with fever and white blood cells in urine  Patient did not have any dysuria or urinary frequency  Continue ceftriaxone course of antibiotics  Blood culture and urine culture  IV fluid  Improving

## 2024-01-07 PROBLEM — R78.81 BACTEREMIA: Status: ACTIVE | Noted: 2024-01-07

## 2024-01-07 LAB
ALBUMIN SERPL BCP-MCNC: 2.9 G/DL (ref 3.2–4.9)
ALBUMIN/GLOB SERPL: 0.9 G/DL
ALP SERPL-CCNC: 102 U/L (ref 30–99)
ALT SERPL-CCNC: 19 U/L (ref 2–50)
ANION GAP SERPL CALC-SCNC: 12 MMOL/L (ref 7–16)
AST SERPL-CCNC: 12 U/L (ref 12–45)
BACTERIA UR CULT: ABNORMAL
BACTERIA UR CULT: ABNORMAL
BASOPHILS # BLD AUTO: 0.4 % (ref 0–1.8)
BASOPHILS # BLD: 0.06 K/UL (ref 0–0.12)
BILIRUB SERPL-MCNC: 0.3 MG/DL (ref 0.1–1.5)
BUN SERPL-MCNC: 6 MG/DL (ref 8–22)
CALCIUM ALBUM COR SERPL-MCNC: 8.4 MG/DL (ref 8.5–10.5)
CALCIUM SERPL-MCNC: 7.5 MG/DL (ref 8.5–10.5)
CHLORIDE SERPL-SCNC: 102 MMOL/L (ref 96–112)
CO2 SERPL-SCNC: 21 MMOL/L (ref 20–33)
CREAT SERPL-MCNC: 0.61 MG/DL (ref 0.5–1.4)
CRP SERPL HS-MCNC: 23.02 MG/DL (ref 0–0.75)
EOSINOPHIL # BLD AUTO: 0.27 K/UL (ref 0–0.51)
EOSINOPHIL NFR BLD: 1.9 % (ref 0–6.9)
ERYTHROCYTE [DISTWIDTH] IN BLOOD BY AUTOMATED COUNT: 39.8 FL (ref 35.9–50)
GFR SERPLBLD CREATININE-BSD FMLA CKD-EPI: 123 ML/MIN/1.73 M 2
GLOBULIN SER CALC-MCNC: 3.2 G/DL (ref 1.9–3.5)
GLUCOSE SERPL-MCNC: 119 MG/DL (ref 65–99)
HCT VFR BLD AUTO: 31.4 % (ref 37–47)
HGB BLD-MCNC: 10.3 G/DL (ref 12–16)
IMM GRANULOCYTES # BLD AUTO: 0.15 K/UL (ref 0–0.11)
IMM GRANULOCYTES NFR BLD AUTO: 1.1 % (ref 0–0.9)
LYMPHOCYTES # BLD AUTO: 2.53 K/UL (ref 1–4.8)
LYMPHOCYTES NFR BLD: 18 % (ref 22–41)
MAGNESIUM SERPL-MCNC: 1.7 MG/DL (ref 1.5–2.5)
MCH RBC QN AUTO: 28.1 PG (ref 27–33)
MCHC RBC AUTO-ENTMCNC: 32.8 G/DL (ref 32.2–35.5)
MCV RBC AUTO: 85.8 FL (ref 81.4–97.8)
MONOCYTES # BLD AUTO: 1.2 K/UL (ref 0–0.85)
MONOCYTES NFR BLD AUTO: 8.5 % (ref 0–13.4)
NEUTROPHILS # BLD AUTO: 9.84 K/UL (ref 1.82–7.42)
NEUTROPHILS NFR BLD: 70.1 % (ref 44–72)
NRBC # BLD AUTO: 0 K/UL
NRBC BLD-RTO: 0 /100 WBC (ref 0–0.2)
PHOSPHATE SERPL-MCNC: 3.1 MG/DL (ref 2.5–4.5)
PLATELET # BLD AUTO: 241 K/UL (ref 164–446)
PMV BLD AUTO: 9.9 FL (ref 9–12.9)
POTASSIUM SERPL-SCNC: 3.4 MMOL/L (ref 3.6–5.5)
PROCALCITONIN SERPL-MCNC: 0.19 NG/ML
PROT SERPL-MCNC: 6.1 G/DL (ref 6–8.2)
RBC # BLD AUTO: 3.66 M/UL (ref 4.2–5.4)
SIGNIFICANT IND 70042: ABNORMAL
SITE SITE: ABNORMAL
SODIUM SERPL-SCNC: 135 MMOL/L (ref 135–145)
SOURCE SOURCE: ABNORMAL
WBC # BLD AUTO: 14.1 K/UL (ref 4.8–10.8)

## 2024-01-07 PROCEDURE — 700101 HCHG RX REV CODE 250: Performed by: STUDENT IN AN ORGANIZED HEALTH CARE EDUCATION/TRAINING PROGRAM

## 2024-01-07 PROCEDURE — 700102 HCHG RX REV CODE 250 W/ 637 OVERRIDE(OP): Performed by: STUDENT IN AN ORGANIZED HEALTH CARE EDUCATION/TRAINING PROGRAM

## 2024-01-07 PROCEDURE — 700102 HCHG RX REV CODE 250 W/ 637 OVERRIDE(OP): Mod: JZ | Performed by: INTERNAL MEDICINE

## 2024-01-07 PROCEDURE — 85025 COMPLETE CBC W/AUTO DIFF WBC: CPT

## 2024-01-07 PROCEDURE — 700111 HCHG RX REV CODE 636 W/ 250 OVERRIDE (IP): Performed by: STUDENT IN AN ORGANIZED HEALTH CARE EDUCATION/TRAINING PROGRAM

## 2024-01-07 PROCEDURE — 87040 BLOOD CULTURE FOR BACTERIA: CPT | Mod: 91

## 2024-01-07 PROCEDURE — 80053 COMPREHEN METABOLIC PANEL: CPT

## 2024-01-07 PROCEDURE — 83735 ASSAY OF MAGNESIUM: CPT

## 2024-01-07 PROCEDURE — 86140 C-REACTIVE PROTEIN: CPT

## 2024-01-07 PROCEDURE — A9270 NON-COVERED ITEM OR SERVICE: HCPCS | Performed by: STUDENT IN AN ORGANIZED HEALTH CARE EDUCATION/TRAINING PROGRAM

## 2024-01-07 PROCEDURE — 700101 HCHG RX REV CODE 250: Performed by: INTERNAL MEDICINE

## 2024-01-07 PROCEDURE — 770020 HCHG ROOM/CARE - TELE (206)

## 2024-01-07 PROCEDURE — A9270 NON-COVERED ITEM OR SERVICE: HCPCS | Mod: JZ | Performed by: INTERNAL MEDICINE

## 2024-01-07 PROCEDURE — 84100 ASSAY OF PHOSPHORUS: CPT

## 2024-01-07 PROCEDURE — 99232 SBSQ HOSP IP/OBS MODERATE 35: CPT | Performed by: INTERNAL MEDICINE

## 2024-01-07 PROCEDURE — 84145 PROCALCITONIN (PCT): CPT

## 2024-01-07 RX ORDER — POTASSIUM CHLORIDE 20 MEQ/1
40 TABLET, EXTENDED RELEASE ORAL ONCE
Status: COMPLETED | OUTPATIENT
Start: 2024-01-07 | End: 2024-01-07

## 2024-01-07 RX ADMIN — POTASSIUM CHLORIDE AND SODIUM CHLORIDE: 900; 300 INJECTION, SOLUTION INTRAVENOUS at 18:02

## 2024-01-07 RX ADMIN — ACETAMINOPHEN 650 MG: 325 TABLET, FILM COATED ORAL at 05:52

## 2024-01-07 RX ADMIN — ACETAMINOPHEN 650 MG: 325 TABLET, FILM COATED ORAL at 16:33

## 2024-01-07 RX ADMIN — ACETAMINOPHEN 650 MG: 325 TABLET, FILM COATED ORAL at 23:39

## 2024-01-07 RX ADMIN — POTASSIUM CHLORIDE AND SODIUM CHLORIDE: 900; 300 INJECTION, SOLUTION INTRAVENOUS at 10:00

## 2024-01-07 RX ADMIN — ACETAMINOPHEN 650 MG: 325 TABLET, FILM COATED ORAL at 11:18

## 2024-01-07 RX ADMIN — HEPARIN SODIUM 5000 UNITS: 5000 INJECTION, SOLUTION INTRAVENOUS; SUBCUTANEOUS at 22:00

## 2024-01-07 RX ADMIN — OXYCODONE HYDROCHLORIDE 5 MG: 5 TABLET ORAL at 21:25

## 2024-01-07 RX ADMIN — POTASSIUM CHLORIDE 40 MEQ: 1500 TABLET, EXTENDED RELEASE ORAL at 08:19

## 2024-01-07 RX ADMIN — OXYCODONE HYDROCHLORIDE 5 MG: 5 TABLET ORAL at 16:36

## 2024-01-07 RX ADMIN — HEPARIN SODIUM 5000 UNITS: 5000 INJECTION, SOLUTION INTRAVENOUS; SUBCUTANEOUS at 05:52

## 2024-01-07 RX ADMIN — POTASSIUM CHLORIDE AND SODIUM CHLORIDE: 900; 300 INJECTION, SOLUTION INTRAVENOUS at 07:48

## 2024-01-07 RX ADMIN — CEFTRIAXONE SODIUM 2000 MG: 10 INJECTION, POWDER, FOR SOLUTION INTRAVENOUS at 16:34

## 2024-01-07 ASSESSMENT — ENCOUNTER SYMPTOMS
ORTHOPNEA: 0
DOUBLE VISION: 0
WEAKNESS: 0
WEIGHT LOSS: 0
DIARRHEA: 0
NECK PAIN: 0
PHOTOPHOBIA: 0
CONSTIPATION: 0
CHILLS: 0
ABDOMINAL PAIN: 0
DIZZINESS: 0
VOMITING: 0
MYALGIAS: 0
BACK PAIN: 1
HEMOPTYSIS: 0
BLURRED VISION: 0
COUGH: 0
CLAUDICATION: 0
FEVER: 0
PALPITATIONS: 0
SPEECH CHANGE: 0
NAUSEA: 0

## 2024-01-07 ASSESSMENT — PATIENT HEALTH QUESTIONNAIRE - PHQ9
SUM OF ALL RESPONSES TO PHQ9 QUESTIONS 1 AND 2: 0
1. LITTLE INTEREST OR PLEASURE IN DOING THINGS: NOT AT ALL
2. FEELING DOWN, DEPRESSED, IRRITABLE, OR HOPELESS: NOT AT ALL

## 2024-01-07 ASSESSMENT — COGNITIVE AND FUNCTIONAL STATUS - GENERAL
SUGGESTED CMS G CODE MODIFIER DAILY ACTIVITY: CH
SUGGESTED CMS G CODE MODIFIER MOBILITY: CH
DAILY ACTIVITIY SCORE: 24
MOBILITY SCORE: 24

## 2024-01-07 ASSESSMENT — PAIN DESCRIPTION - PAIN TYPE
TYPE: ACUTE PAIN
TYPE: ACUTE PAIN

## 2024-01-07 ASSESSMENT — FIBROSIS 4 INDEX: FIB4 SCORE: 0.68

## 2024-01-07 NOTE — CARE PLAN
The patient is Stable - Low risk of patient condition declining or worsening    Shift Goals  Clinical Goals: antibiotics  Patient Goals: go home  Family Goals: jose manuel    Progress made toward(s) clinical / shift goals:        Problem: Hemodynamics  Goal: Patient's hemodynamics, fluid balance and neurologic status will be stable or improve  Outcome: Progressing     Problem: Fluid Volume  Goal: Fluid volume balance will be maintained  Outcome: Progressing     Problem: Urinary - Renal Perfusion  Goal: Ability to achieve and maintain adequate renal perfusion and functioning will improve  Outcome: Progressing     Problem: Respiratory  Goal: Patient will achieve/maintain optimum respiratory ventilation and gas exchange  Outcome: Progressing     Problem: Mechanical Ventilation  Goal: Safe management of artificial airway and ventilation  Outcome: Progressing  Goal: Successful weaning off mechanical ventilator, spontaneously maintains adequate gas exchange  Outcome: Progressing  Goal: Patient will be able to express needs and understand communication  Outcome: Progressing     Problem: Physical Regulation  Goal: Diagnostic test results will improve  Outcome: Progressing  Goal: Signs and symptoms of infection will decrease  Outcome: Progressing     Problem: Knowledge Deficit - Standard  Goal: Patient and family/care givers will demonstrate understanding of plan of care, disease process/condition, diagnostic tests and medications  Outcome: Progressing     Problem: Pain - Standard  Goal: Alleviation of pain or a reduction in pain to the patient’s comfort goal  Outcome: Progressing       Patient is not progressing towards the following goals:

## 2024-01-07 NOTE — ASSESSMENT & PLAN NOTE
Blood culture was positive for E. coli 1/2  Repeat blood culture is pending  Likely related to urine infection  Continue ceftriaxone

## 2024-01-07 NOTE — CARE PLAN
The patient is Stable - Low risk of patient condition declining or worsening    Shift Goals  Clinical Goals: VSS, PAIN CONTROL  Patient Goals: GET RID OF HEADACHE    Progress made toward(s) clinical / shift goals:      Problem: Hemodynamics  Goal: Patient's hemodynamics, fluid balance and neurologic status will be stable or improve  Description: Target End Date:  Prior to discharge or change in level of care    Document on Assessment and I/O flowsheet templates    1.  Monitor vital signs, pulse oximetry and cardiac monitor per provider order and/or policy  2.  Maintain blood pressure per provider order  3.  Hemodynamic monitoring per provider order  4.  Manage IV fluids and IV infusions  5.  Monitor intake and output  6.  Daily weights per unit policy or provider order  7.  Assess peripheral pulses and capillary refill  8.  Assess color and body temperature  9.  Position patient for maximum circulation/cardiac output  10. Monitor for signs/symptoms of excessive bleeding  11. Assess mental status, restlessness and changes in level of consciousness  12. Monitor temperature and report fever or hypothermia to provider immediately. Consideration of targeted temperature management.  Outcome: Progressing     Problem: Physical Regulation  Goal: Diagnostic test results will improve  Description: Target End Date:  Prior to discharge or change in level of care    1.  Monitor lactic acid levels  2.  Monitor ABG's  3.  Monitor diagnostic test results  Outcome: Progressing  Goal: Signs and symptoms of infection will decrease  Description: Target End Date:  Prior to discharge or change in level of care    1.  Remove potential routes of infection, such as central lines and urinary catheter  2.  Follow facility protocol for changing IV tubing and sites  3.  Collaborate with Infectious Disease  4.  Antibiotic therapy per provider order  5.  Note drug effects and monitor for antibiotic toxicity  Outcome: Progressing     Problem:  Knowledge Deficit - Standard  Goal: Patient and family/care givers will demonstrate understanding of plan of care, disease process/condition, diagnostic tests and medications  Description: Target End Date:  1-3 days or as soon as patient condition allows    Document in Patient Education    1.  Patient and family/caregiver oriented to unit, equipment, visitation policy and means for communicating concern  2.  Complete/review Learning Assessment  3.  Assess knowledge level of disease process/condition, treatment plan, diagnostic tests and medications  4.  Explain disease process/condition, treatment plan, diagnostic tests and medications  Outcome: Progressing     Problem: Pain - Standard  Goal: Alleviation of pain or a reduction in pain to the patient’s comfort goal  Description: Target End Date:  Prior to discharge or change in level of care    Document on Vitals flowsheet    1.  Document pain using the appropriate pain scale per order or unit policy  2.  Educate and implement non-pharmacologic comfort measures (i.e. relaxation, distraction, massage, cold/heat therapy, etc.)  3.  Pain management medications as ordered  4.  Reassess pain after pain med administration per policy  5.  If opiods administered assess patient's response to pain medication is appropriate per POSS sedation scale  6.  Follow pain management plan developed in collaboration with patient and interdisciplinary team (including palliative care or pain specialists if applicable)  Outcome: Progressing       Patient is not progressing towards the following goals:

## 2024-01-07 NOTE — PROGRESS NOTES
Hospital Medicine Daily Progress Note    Date of Service  1/7/2024    Chief Complaint  Bette Aguilar is a 28 y.o. female admitted 1/5/2024 with fever     Hospital Course  28-year-old female with no significant history except asthma presented 1/5 with coughing and fever.  Patient has had symptoms with coughing, dry not producing sputum, fever and chills with generalized weakness.  Patient denied any dysuria or urinary symptoms however she has lower back pain, and left flank pain.  In admission patient had fever 101 with tachycardia, also labs showed leukocytosis 14.3 with potassium 2.6 and normal kidney function with normal lactic acid.  Chest x-ray did not show any significant infiltration or pneumonia, influenza A and COVID test were negative.  UA showed white blood cell, blood culture is negative.  Ceftriaxone was initiated for possible pyelonephritis with improving on her symptoms.  Blood culture was positive for E. coli 1/2 also urine culture was positive for E. coli.    Interval Problem Update  -Evaluated examined the patient at bedside, patient feels better, no significant pain today with no fever  -Blood culture was positive for E. coli, repeat blood culture today, continue ceftriaxone and possible switch to levofloxacin or ciprofloxacin tomorrow.  -Still having leukocytosis  -Replace potassium and magnesium  -Improving on her flank pain and vomiting.  -Continue IV fluid    I have discussed this patient's plan of care and discharge plan at IDT rounds today with Case Management, Nursing, Nursing leadership, and other members of the IDT team.    Consultants/Specialty  None    Code Status  Full Code    Disposition  The patient is not medically cleared for discharge to home or a post-acute facility.  Anticipate discharge to: home with close outpatient follow-up    I have placed the appropriate orders for post-discharge needs.    Review of Systems  Review of Systems   Constitutional:  Positive for  malaise/fatigue. Negative for chills, fever and weight loss.   HENT:  Negative for ear pain, hearing loss and tinnitus.    Eyes:  Negative for blurred vision, double vision and photophobia.   Respiratory:  Negative for cough and hemoptysis.    Cardiovascular:  Negative for chest pain, palpitations, orthopnea and claudication.   Gastrointestinal:  Negative for abdominal pain, constipation, diarrhea, nausea and vomiting.   Genitourinary:  Negative for dysuria, frequency and urgency.   Musculoskeletal:  Positive for back pain. Negative for myalgias and neck pain.   Skin:  Negative for rash.   Neurological:  Negative for dizziness, speech change and weakness.        Physical Exam  Temp:  [36.2 °C (97.2 °F)-36.7 °C (98.1 °F)] 36.7 °C (98.1 °F)  Pulse:  [83-96] 84  Resp:  [16-18] 17  BP: (112-117)/(58-70) 113/70  SpO2:  [91 %-93 %] 92 %    Physical Exam  Constitutional:       General: She is not in acute distress.     Appearance: She is obese. She is ill-appearing.   HENT:      Head: Normocephalic and atraumatic.   Cardiovascular:      Rate and Rhythm: Normal rate.      Heart sounds: No murmur heard.  Pulmonary:      Effort: No respiratory distress.      Breath sounds: No wheezing.   Abdominal:      General: There is no distension.      Tenderness: There is no abdominal tenderness. There is no guarding.   Musculoskeletal:      Right lower leg: No edema.      Left lower leg: No edema.   Skin:     Coloration: Skin is not pale.      Findings: No bruising, lesion or rash.   Neurological:      General: No focal deficit present.      Mental Status: She is oriented to person, place, and time. Mental status is at baseline.      Cranial Nerves: No cranial nerve deficit.      Motor: No weakness.         Fluids    Intake/Output Summary (Last 24 hours) at 1/7/2024 1518  Last data filed at 1/6/2024 2000  Gross per 24 hour   Intake 300 ml   Output --   Net 300 ml         Laboratory  Recent Labs     01/05/24  1538 01/06/24  2482  01/07/24  0435   WBC 14.3* 14.6* 14.1*   RBC 4.48 4.01* 3.66*   HEMOGLOBIN 12.9 11.4* 10.3*   HEMATOCRIT 36.8* 33.7* 31.4*   MCV 82.1 84.0 85.8   MCH 28.8 28.4 28.1   MCHC 35.1 33.8 32.8   RDW 37.5 38.4 39.8   PLATELETCT 212 207 241   MPV 10.2 10.3 9.9       Recent Labs     01/05/24  1538 01/06/24  0439 01/07/24 0435   SODIUM 139 140 135   POTASSIUM 2.6* 3.6 3.4*   CHLORIDE 102 106 102   CO2 20 22 21   GLUCOSE 139* 116* 119*   BUN 7* 7* 6*   CREATININE 0.68 0.59 0.61   CALCIUM 8.0* 7.5* 7.5*                     Imaging  DX-CHEST-PORTABLE (1 VIEW)   Final Result      No acute cardiopulmonary abnormality.           Assessment/Plan  * Bacteremia  Assessment & Plan  Blood culture was positive for E. coli 1/2  Repeat blood culture is pending  Likely related to urine infection  Continue ceftriaxone    Acute febrile illness  Assessment & Plan  Likely due to above    Acute pyelonephritis  Assessment & Plan  Possible pyelonephritis due to flank pain with fever and white blood cells in urine  Patient did not have any dysuria or urinary frequency  Continue ceftriaxone course of antibiotics  Blood culture and urine culture  IV fluid  Improving    Hypokalemia- (present on admission)  Assessment & Plan  Likely related to vomiting  Replace, improving  Labs daily    Obesity (BMI 30-39.9)- (present on admission)  Assessment & Plan  Diet and lifestyle modification    Mild intermittent asthma without complication- (present on admission)  Assessment & Plan  Not in acute exacerbation  As needed nebs    SIRS (systemic inflammatory response syndrome) (HCC)  Assessment & Plan  SIRS criteria identified on my evaluation include:  Fever, with temperature greater than 100.9 deg F, Tachycardia, with heart rate greater than 90 BPM, Leukocytosis, with WBC greater than 12,000, and Bandemia, greater than 10% bands  SIRS is non-infectious, the patient does not have sepsis           VTE prophylaxis:   SCDs/TEDs   enoxaparin ppx      I have performed  a physical exam and reviewed and updated ROS and Plan today (1/7/2024). In review of yesterday's note (1/6/2024), there are no changes except as documented above.      Greater than 51 minutes spent prepping to see patient (e.g. review of tests) obtaining and/or reviewing separately obtained history. Performing a medically appropriate examination and/ evaluation.  Counseling and educating the patient/family/caregiver.  Ordering medications, tests, or procedures.  Referring and communicating with other health care professionals.  Documenting clinical information in EPIC.  Independently interpreting results and communicating results to patient/family/caregiver.  Care coordination

## 2024-01-07 NOTE — PROGRESS NOTES
Monitor summary:        Rhythm: SR/ST  Rate:   Ectopy:N/A  Measurements: 14./.08/.37        12hr chart check

## 2024-01-07 NOTE — PROGRESS NOTES
Assumed care on patient post bedside report. Awake, in no apparent distress. Vss. C/o 9/10 headache. Will give PRN med per order.    Tele on and heart rhythm and rate checked on monitor.    Plan of care, monitor electrolytes and replace as needed and manage pain.     Safety precautions in place are side rails up x 2, bed to lowest level, alarms on. Appropriate to use call light for needs and assistance. Will round hourly and as needed.

## 2024-01-08 ENCOUNTER — PHARMACY VISIT (OUTPATIENT)
Dept: PHARMACY | Facility: MEDICAL CENTER | Age: 29
End: 2024-01-08
Payer: COMMERCIAL

## 2024-01-08 VITALS
WEIGHT: 207.23 LBS | HEIGHT: 62 IN | BODY MASS INDEX: 38.14 KG/M2 | OXYGEN SATURATION: 95 % | DIASTOLIC BLOOD PRESSURE: 79 MMHG | HEART RATE: 77 BPM | SYSTOLIC BLOOD PRESSURE: 115 MMHG | TEMPERATURE: 98.2 F | RESPIRATION RATE: 18 BRPM

## 2024-01-08 LAB
ANION GAP SERPL CALC-SCNC: 12 MMOL/L (ref 7–16)
BASOPHILS # BLD AUTO: 0.4 % (ref 0–1.8)
BASOPHILS # BLD: 0.05 K/UL (ref 0–0.12)
BUN SERPL-MCNC: 4 MG/DL (ref 8–22)
CALCIUM SERPL-MCNC: 7.8 MG/DL (ref 8.5–10.5)
CHLORIDE SERPL-SCNC: 103 MMOL/L (ref 96–112)
CO2 SERPL-SCNC: 22 MMOL/L (ref 20–33)
CREAT SERPL-MCNC: 0.55 MG/DL (ref 0.5–1.4)
CRP SERPL HS-MCNC: 13.66 MG/DL (ref 0–0.75)
EOSINOPHIL # BLD AUTO: 0.39 K/UL (ref 0–0.51)
EOSINOPHIL NFR BLD: 3.5 % (ref 0–6.9)
ERYTHROCYTE [DISTWIDTH] IN BLOOD BY AUTOMATED COUNT: 38.4 FL (ref 35.9–50)
GFR SERPLBLD CREATININE-BSD FMLA CKD-EPI: 126 ML/MIN/1.73 M 2
GLUCOSE SERPL-MCNC: 105 MG/DL (ref 65–99)
HCT VFR BLD AUTO: 31.7 % (ref 37–47)
HGB BLD-MCNC: 10.7 G/DL (ref 12–16)
IMM GRANULOCYTES # BLD AUTO: 0.26 K/UL (ref 0–0.11)
IMM GRANULOCYTES NFR BLD AUTO: 2.3 % (ref 0–0.9)
LYMPHOCYTES # BLD AUTO: 2.13 K/UL (ref 1–4.8)
LYMPHOCYTES NFR BLD: 19 % (ref 22–41)
MCH RBC QN AUTO: 28.1 PG (ref 27–33)
MCHC RBC AUTO-ENTMCNC: 33.8 G/DL (ref 32.2–35.5)
MCV RBC AUTO: 83.2 FL (ref 81.4–97.8)
MONOCYTES # BLD AUTO: 0.89 K/UL (ref 0–0.85)
MONOCYTES NFR BLD AUTO: 7.9 % (ref 0–13.4)
NEUTROPHILS # BLD AUTO: 7.51 K/UL (ref 1.82–7.42)
NEUTROPHILS NFR BLD: 66.9 % (ref 44–72)
NRBC # BLD AUTO: 0 K/UL
NRBC BLD-RTO: 0 /100 WBC (ref 0–0.2)
PLATELET # BLD AUTO: 275 K/UL (ref 164–446)
PMV BLD AUTO: 10.4 FL (ref 9–12.9)
POTASSIUM SERPL-SCNC: 3.7 MMOL/L (ref 3.6–5.5)
RBC # BLD AUTO: 3.81 M/UL (ref 4.2–5.4)
SODIUM SERPL-SCNC: 137 MMOL/L (ref 135–145)
WBC # BLD AUTO: 11.2 K/UL (ref 4.8–10.8)

## 2024-01-08 PROCEDURE — RXMED WILLOW AMBULATORY MEDICATION CHARGE: Performed by: INTERNAL MEDICINE

## 2024-01-08 PROCEDURE — 99239 HOSP IP/OBS DSCHRG MGMT >30: CPT | Performed by: INTERNAL MEDICINE

## 2024-01-08 PROCEDURE — 85025 COMPLETE CBC W/AUTO DIFF WBC: CPT

## 2024-01-08 PROCEDURE — 80048 BASIC METABOLIC PNL TOTAL CA: CPT

## 2024-01-08 PROCEDURE — 86140 C-REACTIVE PROTEIN: CPT

## 2024-01-08 PROCEDURE — A9270 NON-COVERED ITEM OR SERVICE: HCPCS | Performed by: STUDENT IN AN ORGANIZED HEALTH CARE EDUCATION/TRAINING PROGRAM

## 2024-01-08 PROCEDURE — 700102 HCHG RX REV CODE 250 W/ 637 OVERRIDE(OP): Performed by: STUDENT IN AN ORGANIZED HEALTH CARE EDUCATION/TRAINING PROGRAM

## 2024-01-08 PROCEDURE — 700101 HCHG RX REV CODE 250: Performed by: INTERNAL MEDICINE

## 2024-01-08 RX ORDER — SULFAMETHOXAZOLE AND TRIMETHOPRIM 800; 160 MG/1; MG/1
1 TABLET ORAL 2 TIMES DAILY
Qty: 16 TABLET | Refills: 0 | Status: ACTIVE | OUTPATIENT
Start: 2024-01-08 | End: 2024-01-16

## 2024-01-08 RX ORDER — BENZONATATE 100 MG/1
100 CAPSULE ORAL 3 TIMES DAILY PRN
Qty: 30 CAPSULE | Refills: 0 | Status: SHIPPED | OUTPATIENT
Start: 2024-01-08

## 2024-01-08 RX ORDER — ALBUTEROL SULFATE 90 UG/1
2 AEROSOL, METERED RESPIRATORY (INHALATION) EVERY 6 HOURS PRN
Qty: 8.5 G | Refills: 2 | Status: SHIPPED | OUTPATIENT
Start: 2024-01-08

## 2024-01-08 RX ORDER — CIPROFLOXACIN 500 MG/1
500 TABLET, FILM COATED ORAL 2 TIMES DAILY
Qty: 20 TABLET | Refills: 0 | Status: SHIPPED | OUTPATIENT
Start: 2024-01-08 | End: 2024-01-08

## 2024-01-08 RX ADMIN — POTASSIUM CHLORIDE AND SODIUM CHLORIDE: 900; 300 INJECTION, SOLUTION INTRAVENOUS at 02:50

## 2024-01-08 RX ADMIN — OXYCODONE HYDROCHLORIDE 5 MG: 5 TABLET ORAL at 05:08

## 2024-01-08 ASSESSMENT — PAIN DESCRIPTION - PAIN TYPE
TYPE: ACUTE PAIN
TYPE: ACUTE PAIN

## 2024-01-08 ASSESSMENT — FIBROSIS 4 INDEX: FIB4 SCORE: 0.3

## 2024-01-08 NOTE — PROGRESS NOTES
Report received from night shift RN, assumed care of pt. Pt A&Ox4. Plan of care discussed with pt, labs and chart reviewed. All needs met at this time. Tele box on. On room air. Call light within reach, bed locked and in lowest position. All fall precautions and hourly rounding in place.

## 2024-01-08 NOTE — CARE PLAN
Problem: Hemodynamics  Goal: Patient's hemodynamics, fluid balance and neurologic status will be stable or improve  Outcome: Progressing     Problem: Fluid Volume  Goal: Fluid volume balance will be maintained  Outcome: Progressing     Problem: Urinary - Renal Perfusion  Goal: Ability to achieve and maintain adequate renal perfusion and functioning will improve  Outcome: Progressing     Problem: Respiratory  Goal: Patient will achieve/maintain optimum respiratory ventilation and gas exchange  Outcome: Progressing     Problem: Knowledge Deficit - Standard  Goal: Patient and family/care givers will demonstrate understanding of plan of care, disease process/condition, diagnostic tests and medications  Outcome: Progressing     Problem: Pain - Standard  Goal: Alleviation of pain or a reduction in pain to the patient’s comfort goal  Outcome: Progressing   The patient is Stable - Low risk of patient condition declining or worsening    Shift Goals  Clinical Goals: VSS, safety  Patient Goals: to go home  Family Goals: to go home    Progress made toward(s) clinical / shift goals:  VSS, safety    Patient is not progressing towards the following goals:

## 2024-01-08 NOTE — DISCHARGE SUMMARY
Discharge Summary    CHIEF COMPLAINT ON ADMISSION  Chief Complaint   Patient presents with    Body Aches     X 2 days, went to urgent care 2 days ago, covid and flu negative     Vomiting     Starting today    Chills     X 2 days    Fever     101.1F in triage, started feeling feverish today       Reason for Admission  Pyelonephritis complicated with bacteremia  E. coli bacteremia    Admission Date  1/5/2024    CODE STATUS  Full Code    HPI & HOSPITAL COURSE    28-year-old female with no significant history except asthma presented 1/5 with coughing and fever.  Patient has had symptoms with coughing, dry not producing sputum, fever and chills with generalized weakness.  Patient denied any dysuria or urinary symptoms however she has lower back pain, and left flank pain.  In admission patient had fever 101 with tachycardia, also labs showed leukocytosis 14.3 with potassium 2.6 and normal kidney function with normal lactic acid.  Chest x-ray did not show any significant infiltration or pneumonia, influenza A and COVID test were negative.  UA showed white blood cell, blood culture is negative.  Ceftriaxone was initiated for possible pyelonephritis with improving on her symptoms.  After discussion with ID pharmacist blood and urine culture and sensitivities, and since patient has prolonged QTc, will go with Bactrim for 7 days after discharge.  Patient has an appointment with her primary care doctor on Monday to follow-up and follow the results of blood culture.    On the day of discharge the patient is alert oriented x 4, denied any fever or chills, improving on her leukocytosis, repeat blood culture on 1/7 is still negative, patient has appointment with PCP next Monday, discussed the plan of care with the patient and family answered all their questions, encouraged her to come back to the emergency for any new symptoms.    Therefore, she is discharged in good and stable condition to home with close outpatient  follow-up.    The patient met 2-midnight criteria for an inpatient stay at the time of discharge.    Discharge Date  01/08/24    FOLLOW UP ITEMS POST DISCHARGE  Follow-up with PCP to follow-up with the blood culture    DISCHARGE DIAGNOSES  Principal Problem:    Bacteremia (POA: Unknown)  Active Problems:    Mild intermittent asthma without complication (POA: Yes)    Obesity (BMI 30-39.9) (POA: Yes)    Hypokalemia (POA: Yes)    Acute pyelonephritis (POA: Unknown)    Acute febrile illness (POA: Unknown)    SIRS (systemic inflammatory response syndrome) (HCC) (POA: Unknown)  Resolved Problems:    * No resolved hospital problems. *      FOLLOW UP  Uyen Samaniego M.D.  [address] [facility] [id number] [phone]    Follow up in 1 week(s)        MEDICATIONS ON DISCHARGE     Medication List        START taking these medications        Instructions   sulfamethoxazole-trimethoprim 800-160 MG tablet  Commonly known as: Bactrim DS   Take 1 Tablet by mouth 2 times a day for 8 days.  Dose: 1 Tablet            CONTINUE taking these medications        Instructions   albuterol 108 (90 Base) MCG/ACT Aers inhalation aerosol   Inhale 2 Puffs every 6 hours as needed for Shortness of Breath.  Dose: 2 Puff     benzonatate 100 MG Caps  Commonly known as: Tessalon   Take 1 Capsule by mouth 3 times a day as needed for Cough.  Dose: 100 mg     ethinyl estradiol-etonogestrel 0.12-0.015 MG/24HR vaginal ring  Commonly known as: Nuvaring   Insert 1 Each into the vagina every 4 weeks. Insert 1 ring into the vagina and leave in place for 3 weeks. Remove for 1 week before replacing with new ring.  Dose: 1 Each     ibuprofen 200 MG Tabs  Commonly known as: Motrin   Take 600-1,000 mg by mouth every 6 hours as needed for Mild Pain or Moderate Pain. 3 to 5 tablets = 600 to 1,000 mg.  Dose: 600-1,000 mg     loratadine 10 MG Tabs  Commonly known as: Claritin   Take 10 mg by mouth every day.  Dose: 10 mg     multivitamin Tabs   Take 1 Tablet by  mouth every day.  Dose: 1 Tablet     Vicks DayQuil Severe Cold/Flu 5--325 MG/15ML Liqd  Generic drug: Phenylephrine-DM-GG-APAP   Take 30 mL by mouth every 6 hours as needed (Cough, Mild Pain or Fever).  Dose: 30 mL              Allergies  Allergies   Allergen Reactions    Peanut-Derived Rash     Redness and dryness affecting face    Sagebrush Runny Nose             DIET  Orders Placed This Encounter   Procedures    Diet Order Diet: Regular     Standing Status:   Standing     Number of Occurrences:   1     Order Specific Question:   Diet:     Answer:   Regular [1]       ACTIVITY  As tolerated.  Weight bearing as tolerated    CONSULTATIONS  None    PROCEDURES  None    LABORATORY  Lab Results   Component Value Date    SODIUM 137 01/08/2024    POTASSIUM 3.7 01/08/2024    CHLORIDE 103 01/08/2024    CO2 22 01/08/2024    GLUCOSE 105 (H) 01/08/2024    BUN 4 (L) 01/08/2024    CREATININE 0.55 01/08/2024    CREATININE 0.7 12/02/2008        Lab Results   Component Value Date    WBC 11.2 (H) 01/08/2024    HEMOGLOBIN 10.7 (L) 01/08/2024    HEMATOCRIT 31.7 (L) 01/08/2024    PLATELETCT 275 01/08/2024        Total time of the discharge process exceeds 35 minutes.

## 2024-01-09 LAB
BACTERIA BLD CULT: ABNORMAL
BACTERIA BLD CULT: ABNORMAL
SIGNIFICANT IND 70042: ABNORMAL
SITE SITE: ABNORMAL
SOURCE SOURCE: ABNORMAL

## 2024-01-10 LAB
BACTERIA BLD CULT: NORMAL
SIGNIFICANT IND 70042: NORMAL
SITE SITE: NORMAL
SOURCE SOURCE: NORMAL

## 2024-01-12 LAB
BACTERIA BLD CULT: NORMAL
BACTERIA BLD CULT: NORMAL
SIGNIFICANT IND 70042: NORMAL
SIGNIFICANT IND 70042: NORMAL
SITE SITE: NORMAL
SITE SITE: NORMAL
SOURCE SOURCE: NORMAL
SOURCE SOURCE: NORMAL

## 2024-01-15 ENCOUNTER — APPOINTMENT (OUTPATIENT)
Dept: INTERNAL MEDICINE | Facility: OTHER | Age: 29
End: 2024-01-15
Payer: COMMERCIAL

## 2024-01-16 ENCOUNTER — OFFICE VISIT (OUTPATIENT)
Dept: INTERNAL MEDICINE | Facility: OTHER | Age: 29
End: 2024-01-16
Payer: COMMERCIAL

## 2024-01-16 VITALS
OXYGEN SATURATION: 97 % | TEMPERATURE: 98.4 F | WEIGHT: 199.4 LBS | SYSTOLIC BLOOD PRESSURE: 120 MMHG | HEIGHT: 62 IN | HEART RATE: 85 BPM | BODY MASS INDEX: 36.7 KG/M2 | DIASTOLIC BLOOD PRESSURE: 80 MMHG

## 2024-01-16 DIAGNOSIS — E87.6 HYPOKALEMIA: ICD-10-CM

## 2024-01-16 DIAGNOSIS — Z91.89 AT RISK FOR LONG QT SYNDROME: ICD-10-CM

## 2024-01-16 DIAGNOSIS — N92.0 MENORRHAGIA WITH REGULAR CYCLE: ICD-10-CM

## 2024-01-16 PROCEDURE — 3074F SYST BP LT 130 MM HG: CPT | Performed by: STUDENT IN AN ORGANIZED HEALTH CARE EDUCATION/TRAINING PROGRAM

## 2024-01-16 PROCEDURE — 93000 ELECTROCARDIOGRAM COMPLETE: CPT | Performed by: STUDENT IN AN ORGANIZED HEALTH CARE EDUCATION/TRAINING PROGRAM

## 2024-01-16 PROCEDURE — 3079F DIAST BP 80-89 MM HG: CPT | Performed by: STUDENT IN AN ORGANIZED HEALTH CARE EDUCATION/TRAINING PROGRAM

## 2024-01-16 PROCEDURE — 99214 OFFICE O/P EST MOD 30 MIN: CPT | Mod: GC | Performed by: STUDENT IN AN ORGANIZED HEALTH CARE EDUCATION/TRAINING PROGRAM

## 2024-01-16 ASSESSMENT — PATIENT HEALTH QUESTIONNAIRE - PHQ9
SUM OF ALL RESPONSES TO PHQ QUESTIONS 1-9: 0
1. LITTLE INTEREST OR PLEASURE IN DOING THINGS: NOT AT ALL
2. FEELING DOWN, DEPRESSED, IRRITABLE, OR HOPELESS: NOT AT ALL
5. POOR APPETITE OR OVEREATING: NOT AT ALL
9. THOUGHTS THAT YOU WOULD BE BETTER OFF DEAD, OR OF HURTING YOURSELF: NOT AT ALL
4. FEELING TIRED OR HAVING LITTLE ENERGY: NOT AT ALL
7. TROUBLE CONCENTRATING ON THINGS, SUCH AS READING THE NEWSPAPER OR WATCHING TELEVISION: NOT AT ALL
SUM OF ALL RESPONSES TO PHQ9 QUESTIONS 1 AND 2: 0
8. MOVING OR SPEAKING SO SLOWLY THAT OTHER PEOPLE COULD HAVE NOTICED. OR THE OPPOSITE, BEING SO FIGETY OR RESTLESS THAT YOU HAVE BEEN MOVING AROUND A LOT MORE THAN USUAL: NOT AT ALL
3. TROUBLE FALLING OR STAYING ASLEEP OR SLEEPING TOO MUCH: NOT AT ALL
6. FEELING BAD ABOUT YOURSELF - OR THAT YOU ARE A FAILURE OR HAVE LET YOURSELF OR YOUR FAMILY DOWN: NOT AL ALL

## 2024-01-16 ASSESSMENT — FIBROSIS 4 INDEX: FIB4 SCORE: 0.3

## 2024-01-16 NOTE — PROGRESS NOTES
"      Office Visit Follow up    Chief Complaint   Patient presents with    Follow-Up     Hospital follow up        Subjective   - - pt was discharged after a UTI with E Coli bacteremia and was given an additional 8 days of Bactrim.    Today, she is asx with no flank pain, F/C/S or dysuria/hematuria.      ROS   -pt states that ever since she was discharged, she has been having a mild cough with SOB/cough/wheezing that is spontaneously improving over time  -pt's LMP has been going on for 10 days now which is longer than it normally is. She denies any recent sexual activity, FB or light-headedness.      /80 (BP Location: Left arm, Patient Position: Sitting, BP Cuff Size: Adult)   Pulse 85   Temp 36.9 °C (98.4 °F) (Temporal)   Ht 1.575 m (5' 2\")   Wt 90.4 kg (199 lb 6.4 oz)   SpO2 97%   BMI 36.47 kg/m²     Physical Exam   -General: NAD, converses well  -Cardio: extremities warm and well perfused  -Resp: no respiratory distress or tachypnea, symmetric expansion  -Abd: soft and nontender, no guarding or rebound    Data   -Hgb = Hgb = 10.7, MCV = 83   -Cr = 0.55   -TSH = wnl (~2017)  -QTC~557 --> 473    EKG Interpretation    Interpreted by emergency department physician    Rhythm: normal sinus   Rate: normal  Axis: normal  Ectopy: none  Conduction: normal  ST Segments: no acute change  T Waves: no acute change  Q Waves: none    Clinical Impression: no acute changes      -TVUS (2019) = hemorrhagic L ovarian cyst      Note: I have reviewed all pertinent labs and diagnostic tests associated with this visit with specific comments listed under the assessment and plan below    Assessment and Plan  Bette Aguilar is a 28 year old  with a h/o Asthma, Postpartum depression, CS x1.    She presented for follow up on her UTI. CBC/CMP/PT/PTT/TSH/Gyn referral for Menorrhagia = " pending.    -----------------------------------------------------------------------------------------------------------------------------------------------------------------------------------------------------------  #Menorrhagia x10 days  (Etiology uncertain, but r/o FB. Lack of sexual intercourse recently makes STI or pregnancy unlikely)  -w/u pending as above to r/o Hypothyroidism and coagulopathy  -no HCG was ordered because pt has not been sexually active  -Gyn recs = pending    #UTI c/b E Coli Bacteremia (resolved)  -s/p IV CTX x3 days  -s/p Bactrim x8 days    #Asthma  -c/w PRN Albuterol    #QTC = 557  -repeat EKG showed QTC wnl  -no plans for further w/u or management      #Preventative Health Care  -per PCP    Code Status = Full Code    Followup: 5 weeks      Ilia Bryant, PGY3  [facility]    Pt has been seen and discussed with the Attending Physician.

## 2025-02-02 ENCOUNTER — APPOINTMENT (OUTPATIENT)
Dept: RADIOLOGY | Facility: MEDICAL CENTER | Age: 30
DRG: 872 | End: 2025-02-02
Payer: COMMERCIAL

## 2025-02-02 ENCOUNTER — HOSPITAL ENCOUNTER (INPATIENT)
Facility: MEDICAL CENTER | Age: 30
LOS: 3 days | DRG: 872 | End: 2025-02-05
Attending: EMERGENCY MEDICINE | Admitting: INTERNAL MEDICINE
Payer: COMMERCIAL

## 2025-02-02 ENCOUNTER — APPOINTMENT (OUTPATIENT)
Dept: RADIOLOGY | Facility: MEDICAL CENTER | Age: 30
DRG: 872 | End: 2025-02-02
Attending: EMERGENCY MEDICINE
Payer: COMMERCIAL

## 2025-02-02 ENCOUNTER — APPOINTMENT (OUTPATIENT)
Dept: RADIOLOGY | Facility: MEDICAL CENTER | Age: 30
DRG: 872 | End: 2025-02-02
Attending: INTERNAL MEDICINE
Payer: COMMERCIAL

## 2025-02-02 DIAGNOSIS — N12 PYELONEPHRITIS: ICD-10-CM

## 2025-02-02 DIAGNOSIS — A41.9 SEPSIS, DUE TO UNSPECIFIED ORGANISM, UNSPECIFIED WHETHER ACUTE ORGAN DYSFUNCTION PRESENT (HCC): ICD-10-CM

## 2025-02-02 PROBLEM — E87.20 LACTIC ACIDOSIS: Status: ACTIVE | Noted: 2025-02-02

## 2025-02-02 PROBLEM — D72.829 LEUKOCYTOSIS: Status: ACTIVE | Noted: 2025-02-02

## 2025-02-02 PROBLEM — R65.20 SEVERE SEPSIS (HCC): Status: ACTIVE | Noted: 2025-02-02

## 2025-02-02 LAB
ALBUMIN SERPL BCP-MCNC: 3.9 G/DL (ref 3.2–4.9)
ALBUMIN/GLOB SERPL: 1.2 G/DL
ALP SERPL-CCNC: 75 U/L (ref 30–99)
ALT SERPL-CCNC: 26 U/L (ref 2–50)
ANION GAP SERPL CALC-SCNC: 16 MMOL/L (ref 7–16)
APPEARANCE UR: ABNORMAL
AST SERPL-CCNC: 25 U/L (ref 12–45)
BACTERIA #/AREA URNS HPF: ABNORMAL /HPF
BASOPHILS # BLD AUTO: 0.2 % (ref 0–1.8)
BASOPHILS # BLD: 0.05 K/UL (ref 0–0.12)
BILIRUB SERPL-MCNC: 1.2 MG/DL (ref 0.1–1.5)
BILIRUB UR QL STRIP.AUTO: NEGATIVE
BUN SERPL-MCNC: 7 MG/DL (ref 8–22)
CALCIUM ALBUM COR SERPL-MCNC: 8.8 MG/DL (ref 8.5–10.5)
CALCIUM SERPL-MCNC: 8.7 MG/DL (ref 8.5–10.5)
CASTS URNS QL MICRO: ABNORMAL /LPF (ref 0–2)
CHLORIDE SERPL-SCNC: 98 MMOL/L (ref 96–112)
CO2 SERPL-SCNC: 20 MMOL/L (ref 20–33)
COLOR UR: ABNORMAL
CREAT SERPL-MCNC: 0.84 MG/DL (ref 0.5–1.4)
EOSINOPHIL # BLD AUTO: 0.04 K/UL (ref 0–0.51)
EOSINOPHIL NFR BLD: 0.2 % (ref 0–6.9)
EPITHELIAL CELLS 1715: ABNORMAL /HPF (ref 0–5)
ERYTHROCYTE [DISTWIDTH] IN BLOOD BY AUTOMATED COUNT: 39.5 FL (ref 35.9–50)
FLUAV RNA SPEC QL NAA+PROBE: NEGATIVE
FLUBV RNA SPEC QL NAA+PROBE: NEGATIVE
GFR SERPLBLD CREATININE-BSD FMLA CKD-EPI: 95 ML/MIN/1.73 M 2
GLOBULIN SER CALC-MCNC: 3.3 G/DL (ref 1.9–3.5)
GLUCOSE SERPL-MCNC: 125 MG/DL (ref 65–99)
GLUCOSE UR STRIP.AUTO-MCNC: NEGATIVE MG/DL
HCG UR QL: NEGATIVE
HCT VFR BLD AUTO: 40.3 % (ref 37–47)
HGB BLD-MCNC: 13.6 G/DL (ref 12–16)
IMM GRANULOCYTES # BLD AUTO: 0.13 K/UL (ref 0–0.11)
IMM GRANULOCYTES NFR BLD AUTO: 0.6 % (ref 0–0.9)
KETONES UR STRIP.AUTO-MCNC: ABNORMAL MG/DL
LACTATE SERPL-SCNC: 1.9 MMOL/L (ref 0.5–2)
LACTATE SERPL-SCNC: 2 MMOL/L (ref 0.5–2)
LACTATE SERPL-SCNC: 2.4 MMOL/L (ref 0.5–2)
LEUKOCYTE ESTERASE UR QL STRIP.AUTO: ABNORMAL
LYMPHOCYTES # BLD AUTO: 1.35 K/UL (ref 1–4.8)
LYMPHOCYTES NFR BLD: 6.7 % (ref 22–41)
MCH RBC QN AUTO: 28.6 PG (ref 27–33)
MCHC RBC AUTO-ENTMCNC: 33.7 G/DL (ref 32.2–35.5)
MCV RBC AUTO: 84.7 FL (ref 81.4–97.8)
MICRO URNS: ABNORMAL
MONOCYTES # BLD AUTO: 1.04 K/UL (ref 0–0.85)
MONOCYTES NFR BLD AUTO: 5.1 % (ref 0–13.4)
NEUTROPHILS # BLD AUTO: 17.63 K/UL (ref 1.82–7.42)
NEUTROPHILS NFR BLD: 87.2 % (ref 44–72)
NITRITE UR QL STRIP.AUTO: POSITIVE
NRBC # BLD AUTO: 0 K/UL
NRBC BLD-RTO: 0 /100 WBC (ref 0–0.2)
PH UR STRIP.AUTO: 6.5 [PH] (ref 5–8)
PLATELET # BLD AUTO: 227 K/UL (ref 164–446)
PMV BLD AUTO: 10.2 FL (ref 9–12.9)
POTASSIUM SERPL-SCNC: 3.1 MMOL/L (ref 3.6–5.5)
PROT SERPL-MCNC: 7.2 G/DL (ref 6–8.2)
PROT UR QL STRIP: 30 MG/DL
RBC # BLD AUTO: 4.76 M/UL (ref 4.2–5.4)
RBC # URNS HPF: ABNORMAL /HPF (ref 0–2)
RBC UR QL AUTO: ABNORMAL
RSV RNA SPEC QL NAA+PROBE: NEGATIVE
SARS-COV-2 RNA RESP QL NAA+PROBE: NOTDETECTED
SODIUM SERPL-SCNC: 134 MMOL/L (ref 135–145)
SP GR UR STRIP.AUTO: 1.01
UROBILINOGEN UR STRIP.AUTO-MCNC: 1 EU/DL
WBC # BLD AUTO: 20.2 K/UL (ref 4.8–10.8)
WBC #/AREA URNS HPF: >100 /HPF

## 2025-02-02 PROCEDURE — 700111 HCHG RX REV CODE 636 W/ 250 OVERRIDE (IP): Mod: JZ | Performed by: EMERGENCY MEDICINE

## 2025-02-02 PROCEDURE — 700111 HCHG RX REV CODE 636 W/ 250 OVERRIDE (IP): Mod: JZ

## 2025-02-02 PROCEDURE — 0241U HCHG SARS-COV-2 COVID-19 NFCT DS RESP RNA 4 TRGT ED POC: CPT

## 2025-02-02 PROCEDURE — 99223 1ST HOSP IP/OBS HIGH 75: CPT | Performed by: INTERNAL MEDICINE

## 2025-02-02 PROCEDURE — 36415 COLL VENOUS BLD VENIPUNCTURE: CPT

## 2025-02-02 PROCEDURE — 700111 HCHG RX REV CODE 636 W/ 250 OVERRIDE (IP): Performed by: INTERNAL MEDICINE

## 2025-02-02 PROCEDURE — 74176 CT ABD & PELVIS W/O CONTRAST: CPT

## 2025-02-02 PROCEDURE — A9270 NON-COVERED ITEM OR SERVICE: HCPCS | Performed by: INTERNAL MEDICINE

## 2025-02-02 PROCEDURE — 87040 BLOOD CULTURE FOR BACTERIA: CPT

## 2025-02-02 PROCEDURE — 700102 HCHG RX REV CODE 250 W/ 637 OVERRIDE(OP): Performed by: INTERNAL MEDICINE

## 2025-02-02 PROCEDURE — 96375 TX/PRO/DX INJ NEW DRUG ADDON: CPT

## 2025-02-02 PROCEDURE — 81025 URINE PREGNANCY TEST: CPT

## 2025-02-02 PROCEDURE — 700105 HCHG RX REV CODE 258: Performed by: INTERNAL MEDICINE

## 2025-02-02 PROCEDURE — 770006 HCHG ROOM/CARE - MED/SURG/GYN SEMI*

## 2025-02-02 PROCEDURE — 83605 ASSAY OF LACTIC ACID: CPT

## 2025-02-02 PROCEDURE — 87186 SC STD MICRODIL/AGAR DIL: CPT

## 2025-02-02 PROCEDURE — 87086 URINE CULTURE/COLONY COUNT: CPT

## 2025-02-02 PROCEDURE — 80053 COMPREHEN METABOLIC PANEL: CPT

## 2025-02-02 PROCEDURE — 81001 URINALYSIS AUTO W/SCOPE: CPT

## 2025-02-02 PROCEDURE — 87077 CULTURE AEROBIC IDENTIFY: CPT

## 2025-02-02 PROCEDURE — 99285 EMERGENCY DEPT VISIT HI MDM: CPT

## 2025-02-02 PROCEDURE — 71045 X-RAY EXAM CHEST 1 VIEW: CPT

## 2025-02-02 PROCEDURE — 85025 COMPLETE CBC W/AUTO DIFF WBC: CPT

## 2025-02-02 PROCEDURE — 96374 THER/PROPH/DIAG INJ IV PUSH: CPT

## 2025-02-02 PROCEDURE — 700105 HCHG RX REV CODE 258: Performed by: EMERGENCY MEDICINE

## 2025-02-02 PROCEDURE — 87015 SPECIMEN INFECT AGNT CONCNTJ: CPT

## 2025-02-02 RX ORDER — ONDANSETRON 2 MG/ML
4 INJECTION INTRAMUSCULAR; INTRAVENOUS EVERY 4 HOURS PRN
Status: DISCONTINUED | OUTPATIENT
Start: 2025-02-02 | End: 2025-02-05 | Stop reason: HOSPADM

## 2025-02-02 RX ORDER — ACETAMINOPHEN 500 MG
500 TABLET ORAL EVERY 6 HOURS PRN
COMMUNITY

## 2025-02-02 RX ORDER — MULTIVIT WITH MINERALS/LUTEIN
250 TABLET ORAL 2 TIMES DAILY
COMMUNITY

## 2025-02-02 RX ORDER — PROCHLORPERAZINE EDISYLATE 5 MG/ML
5-10 INJECTION INTRAMUSCULAR; INTRAVENOUS EVERY 4 HOURS PRN
Status: DISCONTINUED | OUTPATIENT
Start: 2025-02-02 | End: 2025-02-05 | Stop reason: HOSPADM

## 2025-02-02 RX ORDER — CEFTRIAXONE 1 G/1
1000 INJECTION, POWDER, FOR SOLUTION INTRAMUSCULAR; INTRAVENOUS ONCE
Status: COMPLETED | OUTPATIENT
Start: 2025-02-02 | End: 2025-02-02

## 2025-02-02 RX ORDER — OXYCODONE HYDROCHLORIDE 5 MG/1
2.5 TABLET ORAL
Status: DISCONTINUED | OUTPATIENT
Start: 2025-02-02 | End: 2025-02-05 | Stop reason: HOSPADM

## 2025-02-02 RX ORDER — SODIUM CHLORIDE, SODIUM LACTATE, POTASSIUM CHLORIDE, AND CALCIUM CHLORIDE .6; .31; .03; .02 G/100ML; G/100ML; G/100ML; G/100ML
500 INJECTION, SOLUTION INTRAVENOUS
Status: DISCONTINUED | OUTPATIENT
Start: 2025-02-02 | End: 2025-02-05 | Stop reason: HOSPADM

## 2025-02-02 RX ORDER — HYDROMORPHONE HYDROCHLORIDE 1 MG/ML
0.25 INJECTION, SOLUTION INTRAMUSCULAR; INTRAVENOUS; SUBCUTANEOUS
Status: DISCONTINUED | OUTPATIENT
Start: 2025-02-02 | End: 2025-02-05 | Stop reason: HOSPADM

## 2025-02-02 RX ORDER — ACETAMINOPHEN 325 MG/1
650 TABLET ORAL EVERY 6 HOURS PRN
Status: DISCONTINUED | OUTPATIENT
Start: 2025-02-02 | End: 2025-02-03

## 2025-02-02 RX ORDER — ONDANSETRON 4 MG/1
4 TABLET, ORALLY DISINTEGRATING ORAL EVERY 4 HOURS PRN
Status: DISCONTINUED | OUTPATIENT
Start: 2025-02-02 | End: 2025-02-05 | Stop reason: HOSPADM

## 2025-02-02 RX ORDER — PROMETHAZINE HYDROCHLORIDE 25 MG/1
12.5-25 TABLET ORAL EVERY 4 HOURS PRN
Status: DISCONTINUED | OUTPATIENT
Start: 2025-02-02 | End: 2025-02-05 | Stop reason: HOSPADM

## 2025-02-02 RX ORDER — ENOXAPARIN SODIUM 100 MG/ML
40 INJECTION SUBCUTANEOUS DAILY
Status: DISCONTINUED | OUTPATIENT
Start: 2025-02-02 | End: 2025-02-05 | Stop reason: HOSPADM

## 2025-02-02 RX ORDER — POTASSIUM CHLORIDE 1500 MG/1
40 TABLET, EXTENDED RELEASE ORAL 2 TIMES DAILY
Status: COMPLETED | OUTPATIENT
Start: 2025-02-02 | End: 2025-02-03

## 2025-02-02 RX ORDER — PROMETHAZINE HYDROCHLORIDE 25 MG/1
12.5-25 SUPPOSITORY RECTAL EVERY 4 HOURS PRN
Status: DISCONTINUED | OUTPATIENT
Start: 2025-02-02 | End: 2025-02-05 | Stop reason: HOSPADM

## 2025-02-02 RX ORDER — SODIUM CHLORIDE 9 MG/ML
30 INJECTION, SOLUTION INTRAVENOUS ONCE
Status: COMPLETED | OUTPATIENT
Start: 2025-02-02 | End: 2025-02-02

## 2025-02-02 RX ORDER — PHENAZOPYRIDINE HYDROCHLORIDE 99.5 MG/1
199 TABLET ORAL 3 TIMES DAILY PRN
COMMUNITY
Start: 2025-01-23

## 2025-02-02 RX ORDER — LABETALOL HYDROCHLORIDE 5 MG/ML
10 INJECTION, SOLUTION INTRAVENOUS EVERY 4 HOURS PRN
Status: DISCONTINUED | OUTPATIENT
Start: 2025-02-02 | End: 2025-02-05 | Stop reason: HOSPADM

## 2025-02-02 RX ORDER — MORPHINE SULFATE 4 MG/ML
4 INJECTION INTRAVENOUS ONCE
Status: COMPLETED | OUTPATIENT
Start: 2025-02-02 | End: 2025-02-02

## 2025-02-02 RX ORDER — ONDANSETRON 2 MG/ML
4 INJECTION INTRAMUSCULAR; INTRAVENOUS ONCE
Status: COMPLETED | OUTPATIENT
Start: 2025-02-02 | End: 2025-02-02

## 2025-02-02 RX ORDER — OXYCODONE HYDROCHLORIDE 5 MG/1
5 TABLET ORAL
Status: DISCONTINUED | OUTPATIENT
Start: 2025-02-02 | End: 2025-02-05 | Stop reason: HOSPADM

## 2025-02-02 RX ADMIN — ENOXAPARIN SODIUM 40 MG: 100 INJECTION SUBCUTANEOUS at 18:00

## 2025-02-02 RX ADMIN — ACETAMINOPHEN 650 MG: 325 TABLET ORAL at 15:56

## 2025-02-02 RX ADMIN — SODIUM CHLORIDE 1503 ML: 9 INJECTION, SOLUTION INTRAVENOUS at 11:12

## 2025-02-02 RX ADMIN — CEFTRIAXONE SODIUM 1000 MG: 1 INJECTION, POWDER, FOR SOLUTION INTRAMUSCULAR; INTRAVENOUS at 11:08

## 2025-02-02 RX ADMIN — POTASSIUM CHLORIDE: 2 INJECTION, SOLUTION, CONCENTRATE INTRAVENOUS at 17:00

## 2025-02-02 RX ADMIN — OXYCODONE 5 MG: 5 TABLET ORAL at 15:09

## 2025-02-02 RX ADMIN — MORPHINE SULFATE 4 MG: 4 INJECTION, SOLUTION INTRAMUSCULAR; INTRAVENOUS at 11:55

## 2025-02-02 RX ADMIN — ONDANSETRON 4 MG: 2 INJECTION INTRAMUSCULAR; INTRAVENOUS at 11:54

## 2025-02-02 RX ADMIN — POTASSIUM CHLORIDE 40 MEQ: 1500 TABLET, EXTENDED RELEASE ORAL at 17:45

## 2025-02-02 SDOH — ECONOMIC STABILITY: TRANSPORTATION INSECURITY
IN THE PAST 12 MONTHS, HAS LACK OF RELIABLE TRANSPORTATION KEPT YOU FROM MEDICAL APPOINTMENTS, MEETINGS, WORK OR FROM GETTING THINGS NEEDED FOR DAILY LIVING?: PATIENT DECLINED

## 2025-02-02 SDOH — ECONOMIC STABILITY: TRANSPORTATION INSECURITY
IN THE PAST 12 MONTHS, HAS THE LACK OF TRANSPORTATION KEPT YOU FROM MEDICAL APPOINTMENTS OR FROM GETTING MEDICATIONS?: PATIENT DECLINED

## 2025-02-02 ASSESSMENT — ENCOUNTER SYMPTOMS
CHILLS: 1
ABDOMINAL PAIN: 0
NECK PAIN: 0
BACK PAIN: 0
PHOTOPHOBIA: 0
ORTHOPNEA: 0
BLURRED VISION: 0
NAUSEA: 1
SPEECH CHANGE: 0
PALPITATIONS: 0
SHORTNESS OF BREATH: 0
COUGH: 0
DIARRHEA: 0
HEADACHES: 0
SENSORY CHANGE: 0
VOMITING: 1
EYE PAIN: 0
FOCAL WEAKNESS: 0
MYALGIAS: 0
CONSTIPATION: 0
DOUBLE VISION: 0
SPUTUM PRODUCTION: 0
FEVER: 1
DIZZINESS: 0
WEIGHT LOSS: 0
TINGLING: 0
FLANK PAIN: 1
TREMORS: 0
HALLUCINATIONS: 0

## 2025-02-02 ASSESSMENT — LIFESTYLE VARIABLES
EVER HAD A DRINK FIRST THING IN THE MORNING TO STEADY YOUR NERVES TO GET RID OF A HANGOVER: NO
HAVE YOU EVER FELT YOU SHOULD CUT DOWN ON YOUR DRINKING: NO
TOTAL SCORE: 0
TOTAL SCORE: 0
HOW MANY TIMES IN THE PAST YEAR HAVE YOU HAD 5 OR MORE DRINKS IN A DAY: 0
ALCOHOL_USE: YES
TOTAL SCORE: 0
CONSUMPTION TOTAL: NEGATIVE
AVERAGE NUMBER OF DAYS PER WEEK YOU HAVE A DRINK CONTAINING ALCOHOL: 0
HAVE PEOPLE ANNOYED YOU BY CRITICIZING YOUR DRINKING: NO
ON A TYPICAL DAY WHEN YOU DRINK ALCOHOL HOW MANY DRINKS DO YOU HAVE: 0
SUBSTANCE_ABUSE: 0
DOES PATIENT WANT TO STOP DRINKING: NO
EVER FELT BAD OR GUILTY ABOUT YOUR DRINKING: NO

## 2025-02-02 ASSESSMENT — COGNITIVE AND FUNCTIONAL STATUS - GENERAL
DAILY ACTIVITIY SCORE: 24
MOBILITY SCORE: 24
SUGGESTED CMS G CODE MODIFIER DAILY ACTIVITY: CH
SUGGESTED CMS G CODE MODIFIER MOBILITY: CH

## 2025-02-02 ASSESSMENT — PATIENT HEALTH QUESTIONNAIRE - PHQ9
2. FEELING DOWN, DEPRESSED, IRRITABLE, OR HOPELESS: NOT AT ALL
1. LITTLE INTEREST OR PLEASURE IN DOING THINGS: NOT AT ALL
SUM OF ALL RESPONSES TO PHQ9 QUESTIONS 1 AND 2: 0

## 2025-02-02 ASSESSMENT — PAIN DESCRIPTION - PAIN TYPE
TYPE: ACUTE PAIN

## 2025-02-02 ASSESSMENT — SOCIAL DETERMINANTS OF HEALTH (SDOH)
WITHIN THE PAST 12 MONTHS, YOU WORRIED THAT YOUR FOOD WOULD RUN OUT BEFORE YOU GOT THE MONEY TO BUY MORE: PATIENT DECLINED
WITHIN THE PAST 12 MONTHS, THE FOOD YOU BOUGHT JUST DIDN'T LAST AND YOU DIDN'T HAVE MONEY TO GET MORE: PATIENT DECLINED
WITHIN THE LAST YEAR, HAVE TO BEEN RAPED OR FORCED TO HAVE ANY KIND OF SEXUAL ACTIVITY BY YOUR PARTNER OR EX-PARTNER?: NO
WITHIN THE LAST YEAR, HAVE YOU BEEN AFRAID OF YOUR PARTNER OR EX-PARTNER?: NO
IN THE PAST 12 MONTHS, HAS THE ELECTRIC, GAS, OIL, OR WATER COMPANY THREATENED TO SHUT OFF SERVICE IN YOUR HOME?: PATIENT DECLINED
WITHIN THE LAST YEAR, HAVE YOU BEEN KICKED, HIT, SLAPPED, OR OTHERWISE PHYSICALLY HURT BY YOUR PARTNER OR EX-PARTNER?: NO
WITHIN THE LAST YEAR, HAVE YOU BEEN HUMILIATED OR EMOTIONALLY ABUSED IN OTHER WAYS BY YOUR PARTNER OR EX-PARTNER?: NO
IN THE PAST 12 MONTHS, HAS THE ELECTRIC, GAS, OIL, OR WATER COMPANY THREATENED TO SHUT OFF SERVICE IN YOUR HOME?: PATIENT DECLINED
WITHIN THE PAST 12 MONTHS, THE FOOD YOU BOUGHT JUST DIDN'T LAST AND YOU DIDN'T HAVE MONEY TO GET MORE: PATIENT DECLINED
WITHIN THE PAST 12 MONTHS, YOU WORRIED THAT YOUR FOOD WOULD RUN OUT BEFORE YOU GOT THE MONEY TO BUY MORE: PATIENT DECLINED

## 2025-02-02 ASSESSMENT — FIBROSIS 4 INDEX
FIB4 SCORE: 0.31
FIB4 SCORE: 0.67

## 2025-02-02 NOTE — ASSESSMENT & PLAN NOTE
Patient found to have acute pyelonephritis and she has right-sided CVA tenderness pain  CT reviewed  Blood cx x 2: +ve for gram negative bacteremia   -- UA consistent with UTI.  Patient is currently being treated for Pyelo with IV antibiotics.

## 2025-02-02 NOTE — H&P
Hospital Medicine History & Physical Note    Date of Service  2/2/2025    Primary Care Physician  Pcp Pt States None    Consultants  None    Specialist Names: N/A    Code Status  Prior    Chief Complaint  Chief Complaint   Patient presents with    Painful Urination    Low Back Pain       History of Presenting Illness    Bette Aguilar is a 29 y.o. female with past medical history of asthma who presented to the hospital on 2/2/2025 with painful urination and left-sided back pain.  Patient reported she developed burning urination a week ago and she was started taking Azo that helped in her symptoms but she stopped taking it and now for the last 2 days symptoms came back.  She she reported that she has been having burning urination and 1 time she also noticed blood in her urine.  She reported left-sided flank pain associated with her burning urination.  She also reported symptoms of nausea and vomiting and she had 1 episode of vomiting when she got to the hospital.  She also reported symptoms of headache.  She reported subjective feeling of fever but she did not check it with thermometer.  There is no other specific aggravating or alleviating factors.  There is no other associated symptoms.    ER course: Patient found to have severe sepsis and she was started on IV antibiotics and she received sepsis bolus.  On my physical exam patient has right-sided CVA tenderness and patient does not have left sided CVA tenderness.  Due to CVA tenderness and history of hematuria I ordered CT renal without contrast.  I ordered a pregnancy test and I requested bedside RN to perform the pregnancy test before sending her to CT scan.    I discussed about this admission with ER physician Dr. Donato.    I discussed the plan of care with patient and bedside RN.    Review of Systems  Review of Systems   Constitutional:  Positive for chills and fever. Negative for weight loss.   HENT:  Negative for hearing loss and tinnitus.    Eyes:   "Negative for blurred vision, double vision, photophobia and pain.   Respiratory:  Negative for cough, sputum production and shortness of breath.    Cardiovascular:  Negative for chest pain, palpitations, orthopnea and leg swelling.   Gastrointestinal:  Positive for nausea and vomiting. Negative for abdominal pain, constipation and diarrhea.   Genitourinary:  Positive for dysuria, flank pain and hematuria. Negative for frequency and urgency.   Musculoskeletal:  Negative for back pain, joint pain, myalgias and neck pain.   Skin:  Negative for rash.   Neurological:  Negative for dizziness, tingling, tremors, sensory change, speech change, focal weakness and headaches.   Psychiatric/Behavioral:  Negative for hallucinations and substance abuse.    All other systems reviewed and are negative.      Past Medical History   has a past medical history of Asthma and Psychiatric problem.    Surgical History   has a past surgical history that includes other and primary c section.     Family History  family history includes Diabetes in her mother; Hypertension in her maternal grandmother and mother; No Known Problems in her father.   Family history reviewed with patient. There is no family history that is pertinent to the chief complaint.     Social History   reports that she has never smoked. She has been exposed to tobacco smoke. She has never used smokeless tobacco. She reports that she does not drink alcohol and does not use drugs.    Allergies  Allergies   Allergen Reactions    Peanut-Derived Rash     Redness and dryness affecting face    Sagebrush Runny Nose             Medications  Prior to Admission Medications   Prescriptions Last Dose Informant Patient Reported? Taking?   Ascorbic Acid (VITAMIN C) 250 MG Tab 1/31/2025 Patient Yes Yes   Sig: Take 250 mg by mouth 2 times a day.   NON SPECIFIED 1/31/2025 Evening Patient Yes Yes   Sig: Take 1 Tablet by mouth every evening. \"Kfin Gnow\"  OTC weight loss supplement. "   Phenazopyridine HCl (AZO URINARY PAIN RELIEF) 99.5 MG Tab 1/30/2025 Patient Yes Yes   Sig: Take 199 mg by mouth 3 times a day as needed (Urinary Pain). 2 tablets = 199 mg.   acetaminophen (TYLENOL) 500 MG Tab 2/1/2025 at 10:00 PM Patient Yes Yes   Sig: Take 500 mg by mouth every 6 hours as needed for Mild Pain or Fever.   ethinyl estradiol-etonogestrel (NUVARING) 0.12-0.015 MG/24HR vaginal ring 1/12/2025 Patient Yes No   Sig: Insert 1 Each into the vagina every 4 weeks. Insert 1 ring into the vagina and leave in place for 3 weeks. Remove for 1 week before replacing with new ring.      Facility-Administered Medications: None       Physical Exam  Temp:  [37 °C (98.6 °F)] 37 °C (98.6 °F)  Pulse:  [112-120] 114  Resp:  [13-20] 16  BP: (110-120)/(58-67) 110/58  SpO2:  [93 %-96 %] 93 %  Blood Pressure: 110/58   Temperature: 37 °C (98.6 °F)   Pulse: (!) 114   Respiration: 16   Pulse Oximetry: 93 %       Physical Exam  Vitals reviewed.   Constitutional:       General: She is not in acute distress.     Appearance: Normal appearance. She is obese. She is ill-appearing.   HENT:      Head: Normocephalic and atraumatic.      Nose: No congestion.   Eyes:      General:         Right eye: No discharge.         Left eye: No discharge.      Pupils: Pupils are equal, round, and reactive to light.   Cardiovascular:      Rate and Rhythm: Regular rhythm. Tachycardia present.      Pulses: Normal pulses.      Heart sounds: Normal heart sounds. No murmur heard.  Pulmonary:      Effort: Pulmonary effort is normal. No respiratory distress.      Breath sounds: Normal breath sounds. No stridor.   Abdominal:      General: Bowel sounds are normal. There is no distension.      Palpations: Abdomen is soft.      Tenderness: There is no abdominal tenderness. There is right CVA tenderness.   Musculoskeletal:         General: No swelling or tenderness. Normal range of motion.      Cervical back: Normal range of motion. No rigidity.   Skin:      "General: Skin is warm.      Capillary Refill: Capillary refill takes less than 2 seconds.      Coloration: Skin is not jaundiced or pale.      Findings: No bruising.   Neurological:      General: No focal deficit present.      Mental Status: She is alert and oriented to person, place, and time.      Cranial Nerves: No cranial nerve deficit.   Psychiatric:         Mood and Affect: Mood normal.         Behavior: Behavior normal.         Laboratory:  Recent Labs     02/02/25  0957   WBC 20.2*   RBC 4.76   HEMOGLOBIN 13.6   HEMATOCRIT 40.3   MCV 84.7   MCH 28.6   MCHC 33.7   RDW 39.5   PLATELETCT 227   MPV 10.2     Recent Labs     02/02/25  0957   SODIUM 134*   POTASSIUM 3.1*   CHLORIDE 98   CO2 20   GLUCOSE 125*   BUN 7*   CREATININE 0.84   CALCIUM 8.7     Recent Labs     02/02/25  0957   ALTSGPT 26   ASTSGOT 25   ALKPHOSPHAT 75   TBILIRUBIN 1.2   GLUCOSE 125*         No results for input(s): \"NTPROBNP\" in the last 72 hours.      No results for input(s): \"TROPONINT\" in the last 72 hours.    Imaging:  DX-CHEST-PORTABLE (1 VIEW)   Final Result         1. No acute cardiopulmonary abnormalities identified.                     CT-RENAL COLIC EVALUATION(A/P W/O)    (Results Pending)       X-Ray:  My impression is: Chest x-ray on my interpretation did not show any acute abnormalities    Assessment/Plan:  Justification for Admission Status  I anticipate this patient will require at least two midnights for appropriate medical management, necessitating inpatient admission because due to severe sepsis that will require insertion of IV antibiotic and follow-up on culture results to target ending antibiotics.    Patient will need a Med/Surg bed on MEDICAL service .  The need is secondary to severe sepsis.    * Severe sepsis (HCC)- (present on admission)  Assessment & Plan  This is Sepsis Present on admission  SIRS criteria identified on my evaluation include: Tachycardia, with heart rate greater than 90 BPM and Leukocytosis, with " WBC greater than 12,000  Clinical indicators of end organ dysfunction include Lactic Acid greater than 2  Source is urinary  Sepsis protocol initiated  Crystalloid Fluid Administration: Fluid resuscitation ordered per standard protocol - 30 mL/kg per current or ideal body weight  IV antibiotics as appropriate for source of sepsis  Reassessment: I have reassessed the patient's hemodynamic status    I started her on sepsis order set.  Started on IV ceftriaxone per  Follow-up on culture results pain  I ordered CT renal to evaluate for any abscess or stone.      Leukocytosis  Assessment & Plan  Patient found to have leukocytosis secondary to sepsis.  Ordered CBC for tomorrow.    Lactic acidosis  Assessment & Plan  Secondary to sepsis.  Started on sepsis order set.  Every 4 hours lactic acid evaluation.    Pyelonephritis- (present on admission)  Assessment & Plan  Patient found to have acute pyelonephritis and she has right-sided CVA tenderness pain  Ordered CT renal and pregnancy test  Follow-up on CT renal.      Hypokalemia- (present on admission)  Assessment & Plan  Patient found to hypokalemia started on a potassium replacement  Order lab workup tomorrow.    Obesity (BMI 35.0-39.9 without comorbidity)- (present on admission)  Assessment & Plan  Lifestyle modifications  Outpatient follow-up.      I discussed about this admission with ER physician Dr. Donato     VTE prophylaxis: enoxaparin ppx

## 2025-02-02 NOTE — PROGRESS NOTES
4 Eyes Skin Assessment Completed by Jennifer Schoening, RN and Nikole King, VIANNEY.    Head WDL  Ears WDL  Nose WDL  Mouth WDL  Neck WDL  Breast/Chest WDL  Shoulder Blades WDL  Spine WDL  (R) Arm/Elbow/Hand WDL  (L) Arm/Elbow/Hand WDL  Abdomen WDL  Groin WDL  Scrotum/Coccyx/Buttocks WDL  (R) Leg WDL  (L) Leg WDL  (R) Heel/Foot/Toe WDL  (L) Heel/Foot/Toe WDL          Devices In Places Blood Pressure Cuff, PIV      Interventions In Place N/A    Possible Skin Injury No    Pictures Uploaded Into Epic N/A  Wound Consult Placed N/A  RN Wound Prevention Protocol Ordered No

## 2025-02-02 NOTE — ASSESSMENT & PLAN NOTE
This is Sepsis Present on admission  SIRS criteria identified on my evaluation include: Tachycardia, with heart rate greater than 90 BPM and Leukocytosis, with WBC greater than 12,000  Clinical indicators of end organ dysfunction include Lactic Acid greater than 2  Source is urinary  Sepsis protocol initiated  Crystalloid Fluid Administration: Fluid resuscitation ordered per standard protocol - 30 mL/kg per current or ideal body weight  IV antibiotics as appropriate for source of sepsis  Reassessment: I have reassessed the patient's hemodynamic status    Patient is diaphoretic, blood culture x 2 positive.  Repeat blood culture ordered for 2/4/2025  Continue IV fluid  Continue IV Rocephin monitor patient's clinical status

## 2025-02-02 NOTE — ED NOTES
Report called to VIANNEY Goldman.  Transport at bedside to  pt.  Pt sent up with chart and belongings.

## 2025-02-02 NOTE — ED PROVIDER NOTES
ER Provider Note    Scribed for Dr. Fam Donato M.D. by Fatimah Anand. 2/2/2025  10:44 AM    Primary Care Provider: None noted    CHIEF COMPLAINT  Chief Complaint   Patient presents with    Painful Urination    Low Back Pain       EXTERNAL RECORDS REVIEWED  Inpatient Notes Patient was admitted in January 2024 for bacteremia     HPI/ROS  LIMITATION TO HISTORY   Select: : None    OUTSIDE HISTORIAN(S):  None    Bette Aguilar is a 29 y.o. female who presents to the ED for evaluation of painful urination onset two days ago. Patient reports left flank pain, burning with urination, fever, sweats, and chills. Denies cough or vomiting. No alleviating or exacerbating factors noted. She explains she had similar symptoms when she was admitted one year ago, however, she had vomiting at that time. Patient reports a history of asthma, but denies any other past medical history.     PAST MEDICAL HISTORY  Past Medical History:   Diagnosis Date    Asthma     Psychiatric problem     depression in the past       SURGICAL HISTORY  Past Surgical History:   Procedure Laterality Date    OTHER      tonsillectomy    PRIMARY C SECTION         FAMILY HISTORY  Family History   Problem Relation Age of Onset    Hypertension Mother     Diabetes Mother     No Known Problems Father     Hypertension Maternal Grandmother        SOCIAL HISTORY   reports that she has never smoked. She has been exposed to tobacco smoke. She has never used smokeless tobacco. She reports that she does not drink alcohol and does not use drugs.    CURRENT MEDICATIONS  Previous Medications    ACETAMINOPHEN (TYLENOL) 500 MG TAB    Take 500 mg by mouth every 6 hours as needed for Mild Pain or Fever.    ASCORBIC ACID (VITAMIN C) 250 MG TAB    Take 250 mg by mouth 2 times a day.    ETHINYL ESTRADIOL-ETONOGESTREL (NUVARING) 0.12-0.015 MG/24HR VAGINAL RING    Insert 1 Each into the vagina every 4 weeks. Insert 1 ring into the vagina and leave in place for 3 weeks. Remove  "for 1 week before replacing with new ring.    NON SPECIFIED    Take 1 Tablet by mouth every evening. \"Kfin Gnow\"  OTC weight loss supplement.    PHENAZOPYRIDINE HCL (AZO URINARY PAIN RELIEF) 99.5 MG TAB    Take 199 mg by mouth 3 times a day as needed (Urinary Pain). 2 tablets = 199 mg.       ALLERGIES  Peanut-derived and Sagebrush    PHYSICAL EXAM  /67   Pulse (!) 115   Temp 37 °C (98.6 °F) (Temporal)   Resp 16   Ht 1.575 m (5' 2\")   Wt 88.4 kg (194 lb 14.2 oz)   SpO2 96%   BMI 35.65 kg/m²   Constitutional: Alert in no apparent distress.  HENT: No signs of trauma, Bilateral external ears normal, Nose normal.   Eyes: Pupils are equal and reactive, Conjunctiva normal, Non-icteric.   Neck: Normal range of motion, No tenderness, Supple,   Cardiovascular: Tachycardic rate and rhythm, no murmurs.   Thorax & Lungs: Normal breath sounds, No respiratory distress, No wheezing, No chest tenderness.   Abdomen: Bowel sounds normal, Soft, No tenderness,   Skin: Warm, Dry, No erythema, No rash.   Back: No bony tenderness, Left CVA tenderness.   Extremities: No edema, No tenderness, No cyanosis, no tenderness  Neurologic: Alert, Grossly non-focal.   Psychiatric: Affect normal       DIAGNOSTIC STUDIES & PROCEDURES    Labs:   Labs Reviewed   LACTIC ACID - Abnormal; Notable for the following components:       Result Value    Lactic Acid 2.4 (*)     All other components within normal limits   CBC WITH DIFFERENTIAL - Abnormal; Notable for the following components:    WBC 20.2 (*)     Neutrophils-Polys 87.20 (*)     Lymphocytes 6.70 (*)     Neutrophils (Absolute) 17.63 (*)     Monos (Absolute) 1.04 (*)     Immature Granulocytes (abs) 0.13 (*)     All other components within normal limits   COMP METABOLIC PANEL - Abnormal; Notable for the following components:    Sodium 134 (*)     Potassium 3.1 (*)     Glucose 125 (*)     Bun 7 (*)     All other components within normal limits   URINALYSIS - Abnormal; Notable for the " following components:    Color Orange (*)     Character Cloudy (*)     Ketones Trace (*)     Protein 30 (*)     Nitrite Positive (*)     Leukocyte Esterase Large (*)     Occult Blood Small (*)     All other components within normal limits   URINE MICROSCOPIC (W/UA) - Abnormal; Notable for the following components:    WBC >100 (*)     RBC 6-10 (*)     Bacteria Many (*)     All other components within normal limits   BLOOD CULTURE   BLOOD CULTURE   ESTIMATED GFR   LACTIC ACID   LACTIC ACID   URINE CULTURE(NEW)   POCT COV-2, FLU A/B, RSV BY PCR   POC COV-2, FLU A/B, RSV BY PCR     All labs reviewed by me.    Radiology:   The attending Emergency Physician has independently interpreted the diagnostic imaging associated with this visit and is awaiting the final reading from the radiologist, which will be displayed below.    Preliminary interpretation is a follows: No acute cardiopulmonary abnormalities.   Radiologist interpretation: Seen below.    DX-CHEST-PORTABLE (1 VIEW)   Final Result         1. No acute cardiopulmonary abnormalities identified.                          COURSE & MEDICAL DECISION MAKING    ED Observation Status? No; Patient does not meet criteria for ED Observation.     INITIAL ASSESSMENT AND PLAN  Care Narrative:     Sepsis: Infection was suspected 10:44 AM (Time). Sepsis pathway was initiated. 30cc/kg bolus given. Antibiotics were given per protocol.      10:44 AM - Patient seen and evaluated at bedside. Discussed plan of care, including ordering tests to rule out viral illness. Patient agrees to plan of care. Patient will be treated with Rocephin 1 g IV and fluids for her symptoms. Ordered chest x-ray, lactic acid, CBC with differential, CMP, urinalysis, urine culture, blood cultures x2, CoV-2 Flu A/B and RSV PCR to evaluate.    12:05 PM - I discussed the patient's case and the above findings with Dr. Rose (Hospitalist) who agreed to hospitalize the patient. Patient's care was transferred at this  time.      ADDITIONAL PROBLEM LIST AND DISPOSITION  Patient with symptoms of dysuria as well as flank pain.  Ultimately with a lactic acidosis.  Patient is septic.  IV antibiotics initiated.  Fluid bolus given.  Chest x-ray negative.  Will admit the patient to the hospital in guarded condition.    The total critical care time on this patient is 33 minutes, resuscitating patient, speaking with admitting physician, and deciphering test results. This  is exclusive of separately billable procedures.                 DISPOSITION AND DISCUSSIONS  I have discussed management of the patient with the following physicians and SRAVANI's: Dr. Rose (Hospitalist)    Discussion of management with other [facility] or appropriate source(s): None     FINAL IMPRESSION  1. Sepsis, due to unspecified organism, unspecified whether acute organ dysfunction present (HCC)    2. Pyelonephritis      Patient admitted in stable condition.    -ADMIT-         Fatimah GARZA (Nika), am scribing for, and in the presence of, Fam Donato M.D..    Electronically signed by: Fatimah Anand (Nika), 2/2/2025    IFam M.D. personally performed the services described in this documentation, as scribed by Fatimah Anand in my presence, and it is both accurate and complete.    The note accurately reflects work and decisions made by me.  Fam Donato M.D.  2/2/2025  3:30 PM

## 2025-02-02 NOTE — ED NOTES
Chief Complaint   Patient presents with    Painful Urination    Low Back Pain     Pt ambulated to to triage with above complaints x1wk, pt also reports fever at home.   Pt has history of pyelonephritis last year.   Provided with specimen cup and clean cath instruction.

## 2025-02-02 NOTE — ED NOTES
"Med rec completed per patient, with patient's mother at bedside.    Allergies reviewed.    Outpatient antibiotics within the last 30 days: NONE.    ANTICOAGULANTS: NONE.    Preferred pharmacy: [facility] on [address].    *Patient states that she was using AZO Urinary Pain Relief 99.5 mg x 2 tablets 3 times per day for \"a week straight\" and that her last dose was on Thursday (1/30/2025).  "

## 2025-02-03 LAB
ALBUMIN SERPL BCP-MCNC: 3 G/DL (ref 3.2–4.9)
ALBUMIN/GLOB SERPL: 1.2 G/DL
ALP SERPL-CCNC: 56 U/L (ref 30–99)
ALT SERPL-CCNC: 17 U/L (ref 2–50)
ANION GAP SERPL CALC-SCNC: 9 MMOL/L (ref 7–16)
AST SERPL-CCNC: 18 U/L (ref 12–45)
BASOPHILS # BLD AUTO: 0.3 % (ref 0–1.8)
BASOPHILS # BLD: 0.04 K/UL (ref 0–0.12)
BILIRUB SERPL-MCNC: 1 MG/DL (ref 0.1–1.5)
BUN SERPL-MCNC: 6 MG/DL (ref 8–22)
CALCIUM ALBUM COR SERPL-MCNC: 7.9 MG/DL (ref 8.5–10.5)
CALCIUM SERPL-MCNC: 7.1 MG/DL (ref 8.5–10.5)
CHLORIDE SERPL-SCNC: 102 MMOL/L (ref 96–112)
CO2 SERPL-SCNC: 22 MMOL/L (ref 20–33)
CREAT SERPL-MCNC: 0.74 MG/DL (ref 0.5–1.4)
EOSINOPHIL # BLD AUTO: 0.01 K/UL (ref 0–0.51)
EOSINOPHIL NFR BLD: 0.1 % (ref 0–6.9)
ERYTHROCYTE [DISTWIDTH] IN BLOOD BY AUTOMATED COUNT: 40.2 FL (ref 35.9–50)
GFR SERPLBLD CREATININE-BSD FMLA CKD-EPI: 110 ML/MIN/1.73 M 2
GLOBULIN SER CALC-MCNC: 2.5 G/DL (ref 1.9–3.5)
GLUCOSE SERPL-MCNC: 117 MG/DL (ref 65–99)
HCT VFR BLD AUTO: 32.8 % (ref 37–47)
HGB BLD-MCNC: 10.9 G/DL (ref 12–16)
IMM GRANULOCYTES # BLD AUTO: 0.07 K/UL (ref 0–0.11)
IMM GRANULOCYTES NFR BLD AUTO: 0.5 % (ref 0–0.9)
LACTATE SERPL-SCNC: 1.1 MMOL/L (ref 0.5–2)
LYMPHOCYTES # BLD AUTO: 1.42 K/UL (ref 1–4.8)
LYMPHOCYTES NFR BLD: 9.2 % (ref 22–41)
MAGNESIUM SERPL-MCNC: 1.2 MG/DL (ref 1.5–2.5)
MCH RBC QN AUTO: 28.5 PG (ref 27–33)
MCHC RBC AUTO-ENTMCNC: 33.2 G/DL (ref 32.2–35.5)
MCV RBC AUTO: 85.9 FL (ref 81.4–97.8)
MONOCYTES # BLD AUTO: 1.08 K/UL (ref 0–0.85)
MONOCYTES NFR BLD AUTO: 7 % (ref 0–13.4)
NEUTROPHILS # BLD AUTO: 12.87 K/UL (ref 1.82–7.42)
NEUTROPHILS NFR BLD: 82.9 % (ref 44–72)
NRBC # BLD AUTO: 0 K/UL
NRBC BLD-RTO: 0 /100 WBC (ref 0–0.2)
PHOSPHATE SERPL-MCNC: 1.8 MG/DL (ref 2.5–4.5)
PLATELET # BLD AUTO: 159 K/UL (ref 164–446)
PMV BLD AUTO: 10 FL (ref 9–12.9)
POTASSIUM SERPL-SCNC: 3 MMOL/L (ref 3.6–5.5)
PROT SERPL-MCNC: 5.5 G/DL (ref 6–8.2)
RBC # BLD AUTO: 3.82 M/UL (ref 4.2–5.4)
SODIUM SERPL-SCNC: 133 MMOL/L (ref 135–145)
WBC # BLD AUTO: 15.5 K/UL (ref 4.8–10.8)

## 2025-02-03 PROCEDURE — 83605 ASSAY OF LACTIC ACID: CPT

## 2025-02-03 PROCEDURE — 83735 ASSAY OF MAGNESIUM: CPT

## 2025-02-03 PROCEDURE — 84100 ASSAY OF PHOSPHORUS: CPT

## 2025-02-03 PROCEDURE — 700111 HCHG RX REV CODE 636 W/ 250 OVERRIDE (IP)

## 2025-02-03 PROCEDURE — 700102 HCHG RX REV CODE 250 W/ 637 OVERRIDE(OP): Performed by: INTERNAL MEDICINE

## 2025-02-03 PROCEDURE — 80053 COMPREHEN METABOLIC PANEL: CPT

## 2025-02-03 PROCEDURE — 99233 SBSQ HOSP IP/OBS HIGH 50: CPT | Performed by: HOSPITALIST

## 2025-02-03 PROCEDURE — 770006 HCHG ROOM/CARE - MED/SURG/GYN SEMI*

## 2025-02-03 PROCEDURE — 700111 HCHG RX REV CODE 636 W/ 250 OVERRIDE (IP): Performed by: INTERNAL MEDICINE

## 2025-02-03 PROCEDURE — A9270 NON-COVERED ITEM OR SERVICE: HCPCS | Performed by: INTERNAL MEDICINE

## 2025-02-03 PROCEDURE — 36415 COLL VENOUS BLD VENIPUNCTURE: CPT

## 2025-02-03 PROCEDURE — 700102 HCHG RX REV CODE 250 W/ 637 OVERRIDE(OP): Performed by: HOSPITALIST

## 2025-02-03 PROCEDURE — 700102 HCHG RX REV CODE 250 W/ 637 OVERRIDE(OP)

## 2025-02-03 PROCEDURE — A9270 NON-COVERED ITEM OR SERVICE: HCPCS

## 2025-02-03 PROCEDURE — 700105 HCHG RX REV CODE 258: Performed by: INTERNAL MEDICINE

## 2025-02-03 PROCEDURE — 85025 COMPLETE CBC W/AUTO DIFF WBC: CPT

## 2025-02-03 PROCEDURE — 700105 HCHG RX REV CODE 258

## 2025-02-03 PROCEDURE — A9270 NON-COVERED ITEM OR SERVICE: HCPCS | Performed by: HOSPITALIST

## 2025-02-03 PROCEDURE — 700105 HCHG RX REV CODE 258: Performed by: HOSPITALIST

## 2025-02-03 RX ORDER — POTASSIUM CHLORIDE 1500 MG/1
40 TABLET, EXTENDED RELEASE ORAL ONCE
Status: COMPLETED | OUTPATIENT
Start: 2025-02-03 | End: 2025-02-03

## 2025-02-03 RX ORDER — SODIUM CHLORIDE, SODIUM LACTATE, POTASSIUM CHLORIDE, AND CALCIUM CHLORIDE .6; .31; .03; .02 G/100ML; G/100ML; G/100ML; G/100ML
1000 INJECTION, SOLUTION INTRAVENOUS ONCE
Status: COMPLETED | OUTPATIENT
Start: 2025-02-03 | End: 2025-02-03

## 2025-02-03 RX ORDER — ACETAMINOPHEN 325 MG/1
650 TABLET ORAL EVERY 6 HOURS PRN
Status: DISCONTINUED | OUTPATIENT
Start: 2025-02-03 | End: 2025-02-05 | Stop reason: HOSPADM

## 2025-02-03 RX ORDER — PHENAZOPYRIDINE HYDROCHLORIDE 100 MG/1
100 TABLET, FILM COATED ORAL
Status: COMPLETED | OUTPATIENT
Start: 2025-02-03 | End: 2025-02-04

## 2025-02-03 RX ADMIN — HYDROMORPHONE HYDROCHLORIDE 0.25 MG: 1 INJECTION, SOLUTION INTRAMUSCULAR; INTRAVENOUS; SUBCUTANEOUS at 17:59

## 2025-02-03 RX ADMIN — POTASSIUM CHLORIDE 40 MEQ: 1500 TABLET, EXTENDED RELEASE ORAL at 08:24

## 2025-02-03 RX ADMIN — POTASSIUM CHLORIDE 40 MEQ: 1500 TABLET, EXTENDED RELEASE ORAL at 05:59

## 2025-02-03 RX ADMIN — ONDANSETRON 4 MG: 4 TABLET, ORALLY DISINTEGRATING ORAL at 21:13

## 2025-02-03 RX ADMIN — HYDROMORPHONE HYDROCHLORIDE 0.25 MG: 1 INJECTION, SOLUTION INTRAMUSCULAR; INTRAVENOUS; SUBCUTANEOUS at 02:00

## 2025-02-03 RX ADMIN — CEFTRIAXONE SODIUM 2000 MG: 10 INJECTION, POWDER, FOR SOLUTION INTRAVENOUS at 06:01

## 2025-02-03 RX ADMIN — OXYCODONE 5 MG: 5 TABLET ORAL at 21:12

## 2025-02-03 RX ADMIN — PHENAZOPYRIDINE 100 MG: 100 TABLET ORAL at 16:33

## 2025-02-03 RX ADMIN — ACETAMINOPHEN 650 MG: 325 TABLET ORAL at 05:56

## 2025-02-03 RX ADMIN — OXYCODONE 5 MG: 5 TABLET ORAL at 11:45

## 2025-02-03 RX ADMIN — PHENAZOPYRIDINE 100 MG: 100 TABLET ORAL at 08:24

## 2025-02-03 RX ADMIN — OXYCODONE 2.5 MG: 5 TABLET ORAL at 08:29

## 2025-02-03 RX ADMIN — POTASSIUM CHLORIDE 40 MEQ: 1500 TABLET, EXTENDED RELEASE ORAL at 11:45

## 2025-02-03 RX ADMIN — SODIUM CHLORIDE, SODIUM LACTATE, POTASSIUM CHLORIDE, AND CALCIUM CHLORIDE 1000 ML: .6; .31; .03; .02 INJECTION, SOLUTION INTRAVENOUS at 12:20

## 2025-02-03 RX ADMIN — PHENAZOPYRIDINE 100 MG: 100 TABLET ORAL at 11:46

## 2025-02-03 RX ADMIN — ENOXAPARIN SODIUM 40 MG: 100 INJECTION SUBCUTANEOUS at 16:33

## 2025-02-03 RX ADMIN — POTASSIUM CHLORIDE: 2 INJECTION, SOLUTION, CONCENTRATE INTRAVENOUS at 06:02

## 2025-02-03 RX ADMIN — OXYCODONE 5 MG: 5 TABLET ORAL at 16:36

## 2025-02-03 RX ADMIN — OXYCODONE 5 MG: 5 TABLET ORAL at 00:20

## 2025-02-03 ASSESSMENT — ENCOUNTER SYMPTOMS
COUGH: 0
FLANK PAIN: 1
NAUSEA: 1
NERVOUS/ANXIOUS: 1
BLURRED VISION: 0
DOUBLE VISION: 0
MYALGIAS: 1

## 2025-02-03 ASSESSMENT — PAIN DESCRIPTION - PAIN TYPE
TYPE: ACUTE PAIN

## 2025-02-03 NOTE — PROGRESS NOTES
NOC HOSPITALIST CROSS COVER    Notified by RN regarding blood cultures positive for gram-negative rods x 2.  Patient started on ceftriaxone.      -----------------------------------------------------------------------------------------------------------    Electronically signed by:  CARO Nelson PA-C  Hospitalist Services

## 2025-02-03 NOTE — ASSESSMENT & PLAN NOTE
Gram-negative bacteremia possibly the source is being urine.  Repeat blood culture for tomorrow ordered, patient is currently on Rocephin.

## 2025-02-03 NOTE — CARE PLAN
The patient is Watcher - Medium risk of patient condition declining or worsening    Shift Goals  Clinical Goals: CT  Patient Goals: cluster care, pain control  Family Goals: comfort, support patient    Progress made toward(s) clinical / shift goals:  CT completed. Pyridium added for dysuria. Lactic trending down, afebrile    Patient is not progressing towards the following goals: n/a    Problem: Pain - Standard  Goal: Alleviation of pain or a reduction in pain to the patient’s comfort goal  Outcome: Progressing     Problem: Hemodynamics  Goal: Patient's hemodynamics, fluid balance and neurologic status will be stable or improve  Outcome: Progressing     Problem: Fluid Volume  Goal: Fluid volume balance will be maintained  Outcome: Progressing     Problem: Physical Regulation  Goal: Signs and symptoms of infection will decrease  Outcome: Progressing

## 2025-02-03 NOTE — PROGRESS NOTES
Hospital Medicine Daily Progress Note    Date of Service  2/3/2025    Chief Complaint  Bette Aguilar is a 29 y.o. female admitted 2/2/2025 with   Chief Complaint   Patient presents with    Painful Urination    Low Back Pain         Hospital Course  This is a  29 year-old female past medical significant asthma presented to hospital on 2/2/2025  with a complain of painful urination and left-sided flank pain.  Patient stated that she had dysuria a week ago some, started taking Azo which somehow helped her.  But after she stopped taking, started having left-sided flank pain her pain started along with nausea, vomiting, headache, subjective fever decided to come to ER  On presentation in ER, she is noted to be tachycardic, febrile with a temperature as high as 103.3, WBC 20.2, CT renal showing hazy right perinephric fat stranding with minimal prominence in the right ureter no obstructing stone with hepatomegaly.    Blood cultures x 2 was obtained, UA, urine culture was obtained, patient was started on IV antibiotics.  She was admitted for sepsis secondary to urinary source    Interval events:  Patient is alert, awake, answering question appropriately, vital sign has been reviewed, patient continues remain tachycardic from,   --- worsening and afebrile overnight.  Will not provide Tylenol until patient spikes temperature.  Blood cultures x 2 positive for gram-negative conrad, patient is on Rocephin 2 g will continue  --Repeat blood cultur tomorrow  -- Potassium 3.0, replete as needed, will obtain magnesium.  --WBC trended down to 15.5, hemoglobin 10.9, monitor, platelet 159, not actively bleeding.    IV and p.o. narcotic pain medication, need to respiratory and somnolescent's    Considering patient being septic, needs CBC, BMP elevated blood pressure tomorrow  I have discussed this patient's plan of care and discharge plan at IDT rounds today with Case Management, Nursing, Nursing leadership, and other members of the  IDT team.    Consultants/Specialty  None     Code Status  Full Code    Disposition  <MEDICALLYCLEARED>  I have placed the appropriate orders for post-discharge needs.    Review of Systems  ROS     Physical Exam  Temp:  [35.9 °C (96.6 °F)-39.6 °C (103.3 °F)] 36.3 °C (97.4 °F)  Pulse:  [106-120] 106  Resp:  [13-20] 18  BP: ()/(50-67) 100/63  SpO2:  [92 %-97 %] 92 %    Physical Exam    Fluids    Intake/Output Summary (Last 24 hours) at 2/3/2025 0711  Last data filed at 2/2/2025 1906  Gross per 24 hour   Intake 197.14 ml   Output --   Net 197.14 ml        Laboratory  Recent Labs     02/02/25  0957 02/03/25  0150   WBC 20.2* 15.5*   RBC 4.76 3.82*   HEMOGLOBIN 13.6 10.9*   HEMATOCRIT 40.3 32.8*   MCV 84.7 85.9   MCH 28.6 28.5   MCHC 33.7 33.2   RDW 39.5 40.2   PLATELETCT 227 159*   MPV 10.2 10.0     Recent Labs     02/02/25  0957 02/03/25  0150   SODIUM 134* 133*   POTASSIUM 3.1* 3.0*   CHLORIDE 98 102   CO2 20 22   GLUCOSE 125* 117*   BUN 7* 6*   CREATININE 0.84 0.74   CALCIUM 8.7 7.1*                   Imaging  CT-RENAL COLIC EVALUATION(A/P W/O)   Final Result         1.  Hazy right perinephric fat stranding with minimal prominence of the right ureter, no obstructing stone is appreciated, consider recently passed stone or changes of urinary tract infection. Correlate with urinalysis.   2.  Mild fluid-filled distention of small bowel, consider ileus and/or enteritis versus evolving obstructive changes. Radiographic follow-up to resolution recommended as clinically appropriate.   3.  Hepatomegaly      DX-CHEST-PORTABLE (1 VIEW)   Final Result         1. No acute cardiopulmonary abnormalities identified.                          Assessment/Plan  * Severe sepsis (HCC)- (present on admission)  Assessment & Plan  This is Sepsis Present on admission  SIRS criteria identified on my evaluation include: Tachycardia, with heart rate greater than 90 BPM and Leukocytosis, with WBC greater than 12,000  Clinical indicators of  end organ dysfunction include Lactic Acid greater than 2  Source is urinary  Sepsis protocol initiated  Crystalloid Fluid Administration: Fluid resuscitation ordered per standard protocol - 30 mL/kg per current or ideal body weight  IV antibiotics as appropriate for source of sepsis  Reassessment: I have reassessed the patient's hemodynamic status    I started her on sepsis order set.  Started on IV ceftriaxone per  Follow-up on culture results pain  I ordered CT renal to evaluate for any abscess or stone.      Leukocytosis  Assessment & Plan  Patient found to have leukocytosis secondary to sepsis.  Ordered CBC for tomorrow.    Lactic acidosis  Assessment & Plan  Secondary to sepsis.  Started on sepsis order set.  Every 4 hours lactic acid evaluation.    Pyelonephritis- (present on admission)  Assessment & Plan  Patient found to have acute pyelonephritis and she has right-sided CVA tenderness pain  Ordered CT renal and pregnancy test  Follow-up on CT renal.      Hypokalemia- (present on admission)  Assessment & Plan  Patient found to hypokalemia started on a potassium replacement  Order lab workup tomorrow.    Obesity (BMI 35.0-39.9 without comorbidity)- (present on admission)  Assessment & Plan  Lifestyle modifications  Outpatient follow-up.         VTE prophylaxis: VTE Selection    I have performed a physical exam and reviewed and updated ROS and Plan today (2/3/2025). In review of yesterday's note (2/2/2025), there are no changes except as documented above.

## 2025-02-03 NOTE — CARE PLAN
Pt AO x 4.  Pt c/o pain in back, medication intervention given per MAR.  Call light and belongings within reach.  Bed locked and in lowest position.  Hourly rounding.  Needs currently met.           The patient is Stable - Low risk of patient condition declining or worsening    Shift Goals  Clinical Goals: pain mgmt, IVF, K replacement  Patient Goals: comfort  Family Goals:     Progress made toward(s) clinical / shift goals:      Problem: Pain - Standard  Goal: Alleviation of pain or a reduction in pain to the patient’s comfort goal  Outcome: Progressing     Problem: Knowledge Deficit - Standard  Goal: Patient and family/care givers will demonstrate understanding of plan of care, disease process/condition, diagnostic tests and medications  Outcome: Progressing     Problem: Hemodynamics  Goal: Patient's hemodynamics, fluid balance and neurologic status will be stable or improve  Outcome: Progressing     Problem: Urinary - Renal Perfusion  Goal: Ability to achieve and maintain adequate renal perfusion and functioning will improve  Outcome: Progressing     Problem: Respiratory  Goal: Patient will achieve/maintain optimum respiratory ventilation and gas exchange  Outcome: Progressing       Patient is not progressing towards the following goals:

## 2025-02-03 NOTE — CARE PLAN
The patient is Watcher - Medium risk of patient condition declining or worsening     Patient got to floor at 1515. Patient a/o x4. Patient had 102F temp a little bit ago. Tylenol given. Patient currently 98.5F. Patient stated pain 7/10. Pain medication given. Patient resting. Call light and belongings are within reach. Bed is in low locked position.     Progress made toward(s) clinical / shift goals:    Problem: Hemodynamics  Goal: Patient's hemodynamics, fluid balance and neurologic status will be stable or improve  Outcome: Not Progressing     Problem: Pain - Standard  Goal: Alleviation of pain or a reduction in pain to the patient’s comfort goal  2/2/2025 1704 by Jennifer Schoening, R.N.  Outcome: Progressing  2/2/2025 1644 by Jennifer Schoening, R.N.  Outcome: Progressing     Problem: Knowledge Deficit - Standard  Goal: Patient and family/care givers will demonstrate understanding of plan of care, disease process/condition, diagnostic tests and medications  2/2/2025 1704 by Jennifer Schoening, R.N.  Outcome: Progressing  2/2/2025 1644 by Jennifer Schoening, R.N.  Outcome: Progressing     Problem: Fluid Volume  Goal: Fluid volume balance will be maintained  Outcome: Progressing     Problem: Urinary - Renal Perfusion  Goal: Ability to achieve and maintain adequate renal perfusion and functioning will improve  Outcome: Progressing     Problem: Respiratory  Goal: Patient will achieve/maintain optimum respiratory ventilation and gas exchange  Outcome: Progressing     Problem: Mechanical Ventilation  Goal: Safe management of artificial airway and ventilation  Outcome: Progressing  Goal: Successful weaning off mechanical ventilator, spontaneously maintains adequate gas exchange  Outcome: Progressing  Goal: Patient will be able to express needs and understand communication  Outcome: Progressing     Problem: Physical Regulation  Goal: Diagnostic test results will improve  Outcome: Progressing  Goal: Signs and symptoms of  infection will decrease  Outcome: Progressing       Patient is not progressing towards the following goals:      Problem: Hemodynamics  Goal: Patient's hemodynamics, fluid balance and neurologic status will be stable or improve  Outcome: Not Progressing

## 2025-02-03 NOTE — PROGRESS NOTES
Received report from Selene FAITH. Pt was updated on change to care team. Pt resting in bed with family at bedside.

## 2025-02-03 NOTE — HOSPITAL COURSE
This is a  29 year-old female past medical significant asthma presented to hospital on 2/2/2025  with a complain of painful urination and left-sided flank pain.  Patient stated that she had dysuria a week ago some, started taking Azo which somehow helped her.  But after she stopped taking, started having left-sided flank pain her pain started along with nausea, vomiting, headache, subjective fever decided to come to ER  On presentation in ER, she is noted to be tachycardic, febrile with a temperature as high as 103.3, WBC 20.2, CT renal showing hazy right perinephric fat stranding with minimal prominence in the right ureter no obstructing stone with hepatomegaly.    Blood cultures x 2 was obtained, UA, urine culture was obtained, patient was started on IV antibiotics.  She was admitted for sepsis secondary to urinary source    Interval events:  Patient is alert, awake, answering question appropriately, vital sign has been reviewed, patient continues remain tachycardic from,   --- worsening and afebrile overnight.  Will not provide Tylenol until patient spikes temperature.  Blood cultures x 2 positive for gram-negative conrad, patient is on Rocephin 2 g will continue  --Repeat blood cultur tomorrow  -- Potassium 3.0, replete as needed, will obtain magnesium.  --WBC trended down to 15.5, hemoglobin 10.9, monitor, platelet 159, not actively bleeding.    IV and p.o. narcotic pain medication, need to respiratory and somnolescent's    Considering patient being septic, needs CBC, BMP elevated blood pressure tomorrow

## 2025-02-03 NOTE — PROGRESS NOTES
Hospital Medicine Daily Progress Note    Date of Service  2/3/2025    Chief Complaint  Bette Aguilar is a 29 y.o. female admitted 2/2/2025 with   Chief Complaint   Patient presents with    Painful Urination    Low Back Pain         Hospital Course  This is a  29 year-old female past medical significant asthma presented to hospital on 2/2/2025  with a complain of painful urination and left-sided flank pain.  Patient stated that she had dysuria a week ago some, started taking Azo which somehow helped her.  But after she stopped taking, started having left-sided flank pain her pain started along with nausea, vomiting, headache, subjective fever decided to come to ER  On presentation in ER, she is noted to be tachycardic, febrile with a temperature as high as 103.3, WBC 20.2, CT renal showing hazy right perinephric fat stranding with minimal prominence in the right ureter no obstructing stone with hepatomegaly.    Blood cultures x 2 was obtained, UA, urine culture was obtained, patient was started on IV antibiotics.  She was admitted for sepsis secondary to urinary source    Interval events:  Patient is alert, awake, answering question appropriately, vital sign has been reviewed, patient continues remain tachycardic from,   --- worsening and afebrile overnight.  Will not provide Tylenol until patient spikes temperature.  Blood cultures x 2 positive for gram-negative conrad, patient is on Rocephin 2 g will continue  --Repeat blood cultur tomorrow  -- Potassium 3.0, replete as needed, will obtain magnesium.  --WBC trended down to 15.5, hemoglobin 10.9, monitor, platelet 159, not actively bleeding.    IV and p.o. narcotic pain medication, need to respiratory and somnolescent's    Considering patient being septic, needs CBC, BMP  every  aday along with blood cx  result    I have discussed this patient's plan of care and discharge plan at IDT rounds today with Case Management, Nursing, Nursing leadership, and other  members of the IDT team.    Consultants/Specialty  None    Code Status  Full Code    Disposition  The patient is not medically cleared for discharge to home or a post-acute facility.  Anticipate discharge to: home with close outpatient follow-up    I have placed the appropriate orders for post-discharge needs.    Review of Systems  Review of Systems   Constitutional:  Positive for malaise/fatigue.   HENT:  Positive for congestion.    Eyes:  Negative for blurred vision and double vision.   Respiratory:  Negative for cough.    Cardiovascular:  Negative for chest pain.   Gastrointestinal:  Positive for nausea.   Genitourinary:  Positive for flank pain. Negative for hematuria.   Musculoskeletal:  Positive for myalgias.   Psychiatric/Behavioral:  The patient is nervous/anxious.         Physical Exam  Temp:  [35.9 °C (96.6 °F)-39.6 °C (103.3 °F)] 36.3 °C (97.4 °F)  Pulse:  [106-120] 106  Resp:  [13-20] 18  BP: ()/(50-63) 100/63  SpO2:  [92 %-97 %] 92 %    Physical Exam  Vitals reviewed.   Constitutional:       Appearance: She is obese.   HENT:      Head: Normocephalic and atraumatic.   Eyes:      Extraocular Movements: Extraocular movements intact.      Pupils: Pupils are equal, round, and reactive to light.   Cardiovascular:      Rate and Rhythm: Normal rate.   Pulmonary:      Effort: No respiratory distress.      Breath sounds: Normal breath sounds.   Abdominal:      General: Bowel sounds are normal. There is no distension.      Palpations: Abdomen is soft.      Tenderness: There is right CVA tenderness.   Musculoskeletal:      Cervical back: Neck supple.      Right lower leg: No edema.      Left lower leg: No edema.   Skin:     General: Skin is warm.   Neurological:      Mental Status: She is alert and oriented to person, place, and time.      Cranial Nerves: No cranial nerve deficit.         Fluids    Intake/Output Summary (Last 24 hours) at 2/3/2025 1007  Last data filed at 2/3/2025 0917  Gross per 24 hour    Intake 437.14 ml   Output --   Net 437.14 ml        Laboratory  Recent Labs     02/02/25  0957 02/03/25  0150   WBC 20.2* 15.5*   RBC 4.76 3.82*   HEMOGLOBIN 13.6 10.9*   HEMATOCRIT 40.3 32.8*   MCV 84.7 85.9   MCH 28.6 28.5   MCHC 33.7 33.2   RDW 39.5 40.2   PLATELETCT 227 159*   MPV 10.2 10.0     Recent Labs     02/02/25  0957 02/03/25  0150   SODIUM 134* 133*   POTASSIUM 3.1* 3.0*   CHLORIDE 98 102   CO2 20 22   GLUCOSE 125* 117*   BUN 7* 6*   CREATININE 0.84 0.74   CALCIUM 8.7 7.1*                   Imaging  CT-RENAL COLIC EVALUATION(A/P W/O)   Final Result         1.  Hazy right perinephric fat stranding with minimal prominence of the right ureter, no obstructing stone is appreciated, consider recently passed stone or changes of urinary tract infection. Correlate with urinalysis.   2.  Mild fluid-filled distention of small bowel, consider ileus and/or enteritis versus evolving obstructive changes. Radiographic follow-up to resolution recommended as clinically appropriate.   3.  Hepatomegaly      DX-CHEST-PORTABLE (1 VIEW)   Final Result         1. No acute cardiopulmonary abnormalities identified.                          Assessment/Plan  * Severe sepsis (HCC)- (present on admission)  Assessment & Plan  This is Sepsis Present on admission  SIRS criteria identified on my evaluation include: Tachycardia, with heart rate greater than 90 BPM and Leukocytosis, with WBC greater than 12,000  Clinical indicators of end organ dysfunction include Lactic Acid greater than 2  Source is urinary  Sepsis protocol initiated  Crystalloid Fluid Administration: Fluid resuscitation ordered per standard protocol - 30 mL/kg per current or ideal body weight  IV antibiotics as appropriate for source of sepsis  Reassessment: I have reassessed the patient's hemodynamic status    I started her on sepsis order set.  Started on IV ceftriaxone per  Follow-up on culture results pain  I ordered CT renal to evaluate for any abscess or  stone.      Leukocytosis  Assessment & Plan  Patient found to have leukocytosis secondary to sepsis.  Ordered CBC for tomorrow.    Lactic acidosis  Assessment & Plan  Secondary to sepsis.  Started on sepsis order set.  Every 4 hours lactic acid evaluation.    Pyelonephritis- (present on admission)  Assessment & Plan  Patient found to have acute pyelonephritis and she has right-sided CVA tenderness pain  Ordered CT renal and pregnancy test  Follow-up on CT renal.      Hypokalemia- (present on admission)  Assessment & Plan  Patient found to hypokalemia started on a potassium replacement  Order lab workup tomorrow.    Obesity (BMI 35.0-39.9 without comorbidity)- (present on admission)  Assessment & Plan  Lifestyle modifications  Outpatient follow-up.         VTE prophylaxis: Lovenox    Total time spent 52 minutes. I spent greater than 50% of the time for patient care, counseling, and coordination on this date, including unit/floor time, and face-to-face time with the patient as per interval events, my own review of patient's imaging and lab analysis and developing my assessment and plan above.

## 2025-02-04 PROBLEM — E83.39 HYPOPHOSPHATEMIA: Status: ACTIVE | Noted: 2025-02-04

## 2025-02-04 PROBLEM — R11.2 NAUSEA & VOMITING: Status: ACTIVE | Noted: 2025-02-04

## 2025-02-04 PROBLEM — E83.42 HYPOMAGNESEMIA: Status: ACTIVE | Noted: 2025-02-04

## 2025-02-04 LAB
ALBUMIN SERPL BCP-MCNC: 3 G/DL (ref 3.2–4.9)
BACTERIA UR CULT: ABNORMAL
BACTERIA UR CULT: ABNORMAL
BUN SERPL-MCNC: 4 MG/DL (ref 8–22)
CALCIUM ALBUM COR SERPL-MCNC: 8.4 MG/DL (ref 8.5–10.5)
CALCIUM SERPL-MCNC: 7.6 MG/DL (ref 8.5–10.5)
CHLORIDE SERPL-SCNC: 102 MMOL/L (ref 96–112)
CO2 SERPL-SCNC: 24 MMOL/L (ref 20–33)
CREAT SERPL-MCNC: 0.56 MG/DL (ref 0.5–1.4)
ERYTHROCYTE [DISTWIDTH] IN BLOOD BY AUTOMATED COUNT: 39.4 FL (ref 35.9–50)
GFR SERPLBLD CREATININE-BSD FMLA CKD-EPI: 124 ML/MIN/1.73 M 2
GLUCOSE SERPL-MCNC: 131 MG/DL (ref 65–99)
HCT VFR BLD AUTO: 30.6 % (ref 37–47)
HGB BLD-MCNC: 10.3 G/DL (ref 12–16)
MAGNESIUM SERPL-MCNC: 1.5 MG/DL (ref 1.5–2.5)
MCH RBC QN AUTO: 28.7 PG (ref 27–33)
MCHC RBC AUTO-ENTMCNC: 33.7 G/DL (ref 32.2–35.5)
MCV RBC AUTO: 85.2 FL (ref 81.4–97.8)
PHOSPHATE SERPL-MCNC: 1.8 MG/DL (ref 2.5–4.5)
PLATELET # BLD AUTO: 141 K/UL (ref 164–446)
PMV BLD AUTO: 10.4 FL (ref 9–12.9)
POTASSIUM SERPL-SCNC: 3.4 MMOL/L (ref 3.6–5.5)
RBC # BLD AUTO: 3.59 M/UL (ref 4.2–5.4)
SIGNIFICANT IND 70042: ABNORMAL
SITE SITE: ABNORMAL
SODIUM SERPL-SCNC: 135 MMOL/L (ref 135–145)
SOURCE SOURCE: ABNORMAL
WBC # BLD AUTO: 9.9 K/UL (ref 4.8–10.8)

## 2025-02-04 PROCEDURE — 99233 SBSQ HOSP IP/OBS HIGH 50: CPT | Performed by: STUDENT IN AN ORGANIZED HEALTH CARE EDUCATION/TRAINING PROGRAM

## 2025-02-04 PROCEDURE — 93005 ELECTROCARDIOGRAM TRACING: CPT | Mod: TC

## 2025-02-04 PROCEDURE — 85027 COMPLETE CBC AUTOMATED: CPT

## 2025-02-04 PROCEDURE — 83735 ASSAY OF MAGNESIUM: CPT

## 2025-02-04 PROCEDURE — A9270 NON-COVERED ITEM OR SERVICE: HCPCS | Performed by: INTERNAL MEDICINE

## 2025-02-04 PROCEDURE — 700102 HCHG RX REV CODE 250 W/ 637 OVERRIDE(OP): Performed by: INTERNAL MEDICINE

## 2025-02-04 PROCEDURE — A9270 NON-COVERED ITEM OR SERVICE: HCPCS

## 2025-02-04 PROCEDURE — 700111 HCHG RX REV CODE 636 W/ 250 OVERRIDE (IP)

## 2025-02-04 PROCEDURE — 700111 HCHG RX REV CODE 636 W/ 250 OVERRIDE (IP): Performed by: STUDENT IN AN ORGANIZED HEALTH CARE EDUCATION/TRAINING PROGRAM

## 2025-02-04 PROCEDURE — 87040 BLOOD CULTURE FOR BACTERIA: CPT

## 2025-02-04 PROCEDURE — 700105 HCHG RX REV CODE 258: Performed by: INTERNAL MEDICINE

## 2025-02-04 PROCEDURE — 770006 HCHG ROOM/CARE - MED/SURG/GYN SEMI*

## 2025-02-04 PROCEDURE — 700102 HCHG RX REV CODE 250 W/ 637 OVERRIDE(OP): Performed by: STUDENT IN AN ORGANIZED HEALTH CARE EDUCATION/TRAINING PROGRAM

## 2025-02-04 PROCEDURE — 700105 HCHG RX REV CODE 258

## 2025-02-04 PROCEDURE — 700102 HCHG RX REV CODE 250 W/ 637 OVERRIDE(OP)

## 2025-02-04 PROCEDURE — 80069 RENAL FUNCTION PANEL: CPT

## 2025-02-04 PROCEDURE — 700111 HCHG RX REV CODE 636 W/ 250 OVERRIDE (IP): Performed by: INTERNAL MEDICINE

## 2025-02-04 PROCEDURE — 700105 HCHG RX REV CODE 258: Performed by: STUDENT IN AN ORGANIZED HEALTH CARE EDUCATION/TRAINING PROGRAM

## 2025-02-04 PROCEDURE — A9270 NON-COVERED ITEM OR SERVICE: HCPCS | Performed by: STUDENT IN AN ORGANIZED HEALTH CARE EDUCATION/TRAINING PROGRAM

## 2025-02-04 RX ORDER — POTASSIUM CHLORIDE 1500 MG/1
40 TABLET, EXTENDED RELEASE ORAL ONCE
Status: COMPLETED | OUTPATIENT
Start: 2025-02-04 | End: 2025-02-04

## 2025-02-04 RX ADMIN — ENOXAPARIN SODIUM 40 MG: 100 INJECTION SUBCUTANEOUS at 18:09

## 2025-02-04 RX ADMIN — PHENAZOPYRIDINE 100 MG: 100 TABLET ORAL at 12:59

## 2025-02-04 RX ADMIN — PHENAZOPYRIDINE 100 MG: 100 TABLET ORAL at 18:09

## 2025-02-04 RX ADMIN — PHENAZOPYRIDINE 100 MG: 100 TABLET ORAL at 09:37

## 2025-02-04 RX ADMIN — CEFTRIAXONE SODIUM 2000 MG: 10 INJECTION, POWDER, FOR SOLUTION INTRAVENOUS at 05:50

## 2025-02-04 RX ADMIN — OXYCODONE 5 MG: 5 TABLET ORAL at 08:21

## 2025-02-04 RX ADMIN — ACETAMINOPHEN, ASPIRIN, CAFFEINE 1 TABLET: 250; 65; 250 TABLET, FILM COATED ORAL at 11:48

## 2025-02-04 RX ADMIN — CEFAZOLIN 2 G: 2 INJECTION, POWDER, FOR SOLUTION INTRAMUSCULAR; INTRAVENOUS at 21:32

## 2025-02-04 RX ADMIN — DIBASIC SODIUM PHOSPHATE, MONOBASIC POTASSIUM PHOSPHATE AND MONOBASIC SODIUM PHOSPHATE 500 MG: 852; 155; 130 TABLET ORAL at 12:59

## 2025-02-04 RX ADMIN — DIBASIC SODIUM PHOSPHATE, MONOBASIC POTASSIUM PHOSPHATE AND MONOBASIC SODIUM PHOSPHATE 500 MG: 852; 155; 130 TABLET ORAL at 18:09

## 2025-02-04 RX ADMIN — ONDANSETRON 4 MG: 4 TABLET, ORALLY DISINTEGRATING ORAL at 11:50

## 2025-02-04 RX ADMIN — ACETAMINOPHEN, ASPIRIN, CAFFEINE 1 TABLET: 250; 65; 250 TABLET, FILM COATED ORAL at 21:37

## 2025-02-04 RX ADMIN — POTASSIUM CHLORIDE 40 MEQ: 1500 TABLET, EXTENDED RELEASE ORAL at 09:37

## 2025-02-04 RX ADMIN — OXYCODONE 5 MG: 5 TABLET ORAL at 04:38

## 2025-02-04 RX ADMIN — DIBASIC SODIUM PHOSPHATE, MONOBASIC POTASSIUM PHOSPHATE AND MONOBASIC SODIUM PHOSPHATE 500 MG: 852; 155; 130 TABLET ORAL at 09:38

## 2025-02-04 RX ADMIN — POTASSIUM CHLORIDE: 2 INJECTION, SOLUTION, CONCENTRATE INTRAVENOUS at 04:40

## 2025-02-04 ASSESSMENT — ENCOUNTER SYMPTOMS
BLURRED VISION: 0
NERVOUS/ANXIOUS: 1
DOUBLE VISION: 0
FLANK PAIN: 1
MYALGIAS: 1
COUGH: 0
NAUSEA: 1

## 2025-02-04 ASSESSMENT — PAIN DESCRIPTION - PAIN TYPE
TYPE: ACUTE PAIN

## 2025-02-04 NOTE — PROGRESS NOTES
Pt considered a moderate fall risk. Pt refuses strip alarm but ok with bed frame alarm.  Pt educated on fall risk and the purpose of the strip alarm. Pt verbalizes an understanding of the education and risks of not having the strip alarm on. Pt is and is A&Ox 4. Charge nurse notified

## 2025-02-05 ENCOUNTER — PHARMACY VISIT (OUTPATIENT)
Dept: PHARMACY | Facility: MEDICAL CENTER | Age: 30
End: 2025-02-05
Payer: COMMERCIAL

## 2025-02-05 VITALS
BODY MASS INDEX: 35.7 KG/M2 | DIASTOLIC BLOOD PRESSURE: 52 MMHG | SYSTOLIC BLOOD PRESSURE: 100 MMHG | WEIGHT: 194 LBS | RESPIRATION RATE: 17 BRPM | TEMPERATURE: 97 F | HEIGHT: 62 IN | OXYGEN SATURATION: 96 % | HEART RATE: 74 BPM

## 2025-02-05 LAB
ANION GAP SERPL CALC-SCNC: 10 MMOL/L (ref 7–16)
BACTERIA BLD CULT: ABNORMAL
BACTERIA BLD CULT: ABNORMAL
BUN SERPL-MCNC: 5 MG/DL (ref 8–22)
CALCIUM SERPL-MCNC: 7.8 MG/DL (ref 8.5–10.5)
CHLORIDE SERPL-SCNC: 104 MMOL/L (ref 96–112)
CO2 SERPL-SCNC: 23 MMOL/L (ref 20–33)
CREAT SERPL-MCNC: 0.58 MG/DL (ref 0.5–1.4)
EKG IMPRESSION: NORMAL
ERYTHROCYTE [DISTWIDTH] IN BLOOD BY AUTOMATED COUNT: 40 FL (ref 35.9–50)
GFR SERPLBLD CREATININE-BSD FMLA CKD-EPI: 123 ML/MIN/1.73 M 2
GLUCOSE SERPL-MCNC: 97 MG/DL (ref 65–99)
HCT VFR BLD AUTO: 31.3 % (ref 37–47)
HGB BLD-MCNC: 10.2 G/DL (ref 12–16)
MAGNESIUM SERPL-MCNC: 1.6 MG/DL (ref 1.5–2.5)
MCH RBC QN AUTO: 28.3 PG (ref 27–33)
MCHC RBC AUTO-ENTMCNC: 32.6 G/DL (ref 32.2–35.5)
MCV RBC AUTO: 86.7 FL (ref 81.4–97.8)
PHOSPHATE SERPL-MCNC: 3.4 MG/DL (ref 2.5–4.5)
PLATELET # BLD AUTO: 177 K/UL (ref 164–446)
PMV BLD AUTO: 10.8 FL (ref 9–12.9)
POTASSIUM SERPL-SCNC: 3.4 MMOL/L (ref 3.6–5.5)
RBC # BLD AUTO: 3.61 M/UL (ref 4.2–5.4)
SIGNIFICANT IND 70042: ABNORMAL
SITE SITE: ABNORMAL
SODIUM SERPL-SCNC: 137 MMOL/L (ref 135–145)
SOURCE SOURCE: ABNORMAL
WBC # BLD AUTO: 5.9 K/UL (ref 4.8–10.8)

## 2025-02-05 PROCEDURE — 85027 COMPLETE CBC AUTOMATED: CPT

## 2025-02-05 PROCEDURE — 93010 ELECTROCARDIOGRAM REPORT: CPT | Performed by: INTERNAL MEDICINE

## 2025-02-05 PROCEDURE — 700111 HCHG RX REV CODE 636 W/ 250 OVERRIDE (IP): Performed by: STUDENT IN AN ORGANIZED HEALTH CARE EDUCATION/TRAINING PROGRAM

## 2025-02-05 PROCEDURE — 99239 HOSP IP/OBS DSCHRG MGMT >30: CPT | Performed by: STUDENT IN AN ORGANIZED HEALTH CARE EDUCATION/TRAINING PROGRAM

## 2025-02-05 PROCEDURE — 84100 ASSAY OF PHOSPHORUS: CPT

## 2025-02-05 PROCEDURE — A9270 NON-COVERED ITEM OR SERVICE: HCPCS | Performed by: STUDENT IN AN ORGANIZED HEALTH CARE EDUCATION/TRAINING PROGRAM

## 2025-02-05 PROCEDURE — 83735 ASSAY OF MAGNESIUM: CPT

## 2025-02-05 PROCEDURE — RXMED WILLOW AMBULATORY MEDICATION CHARGE: Performed by: STUDENT IN AN ORGANIZED HEALTH CARE EDUCATION/TRAINING PROGRAM

## 2025-02-05 PROCEDURE — 700111 HCHG RX REV CODE 636 W/ 250 OVERRIDE (IP): Performed by: INTERNAL MEDICINE

## 2025-02-05 PROCEDURE — 700102 HCHG RX REV CODE 250 W/ 637 OVERRIDE(OP): Performed by: STUDENT IN AN ORGANIZED HEALTH CARE EDUCATION/TRAINING PROGRAM

## 2025-02-05 PROCEDURE — 80048 BASIC METABOLIC PNL TOTAL CA: CPT

## 2025-02-05 PROCEDURE — 700105 HCHG RX REV CODE 258: Performed by: INTERNAL MEDICINE

## 2025-02-05 PROCEDURE — 700105 HCHG RX REV CODE 258: Performed by: STUDENT IN AN ORGANIZED HEALTH CARE EDUCATION/TRAINING PROGRAM

## 2025-02-05 RX ORDER — SULFAMETHOXAZOLE AND TRIMETHOPRIM 800; 160 MG/1; MG/1
1 TABLET ORAL 2 TIMES DAILY
Qty: 8 TABLET | Refills: 0 | Status: ACTIVE | OUTPATIENT
Start: 2025-02-05 | End: 2025-02-09

## 2025-02-05 RX ORDER — POTASSIUM CHLORIDE 1500 MG/1
60 TABLET, EXTENDED RELEASE ORAL ONCE
Status: COMPLETED | OUTPATIENT
Start: 2025-02-05 | End: 2025-02-05

## 2025-02-05 RX ADMIN — POTASSIUM CHLORIDE 60 MEQ: 1500 TABLET, EXTENDED RELEASE ORAL at 09:23

## 2025-02-05 RX ADMIN — POTASSIUM CHLORIDE: 2 INJECTION, SOLUTION, CONCENTRATE INTRAVENOUS at 04:52

## 2025-02-05 RX ADMIN — ACETAMINOPHEN, ASPIRIN, CAFFEINE 1 TABLET: 250; 65; 250 TABLET, FILM COATED ORAL at 05:33

## 2025-02-05 RX ADMIN — CEFAZOLIN 2 G: 2 INJECTION, POWDER, FOR SOLUTION INTRAMUSCULAR; INTRAVENOUS at 04:53

## 2025-02-05 ASSESSMENT — PAIN DESCRIPTION - PAIN TYPE: TYPE: ACUTE PAIN

## 2025-02-05 NOTE — CARE PLAN
The patient is Watcher - Medium risk of patient condition declining or worsening    Shift Goals  Clinical Goals: monitor headache, n/v  Patient Goals: cluster care, sleep  Family Goals: comfort    Progress made toward(s) clinical / shift goals:  c/o SOB and chest heaviness, VSS. EKG done, WDL.     Patient is not progressing towards the following goals: n/a    Problem: Pain - Standard  Goal: Alleviation of pain or a reduction in pain to the patient’s comfort goal  Outcome: Progressing     Problem: Knowledge Deficit - Standard  Goal: Patient and family/care givers will demonstrate understanding of plan of care, disease process/condition, diagnostic tests and medications  Outcome: Progressing     Problem: Hemodynamics  Goal: Patient's hemodynamics, fluid balance and neurologic status will be stable or improve  Outcome: Progressing     Problem: Urinary - Renal Perfusion  Goal: Ability to achieve and maintain adequate renal perfusion and functioning will improve  Outcome: Progressing     Problem: Mechanical Ventilation  Goal: Safe management of artificial airway and ventilation  Outcome: Met  Goal: Successful weaning off mechanical ventilator, spontaneously maintains adequate gas exchange  Outcome: Met

## 2025-02-05 NOTE — CARE PLAN
Pt AO x 4.  Pt c/o a headache, medication intervention given per MAR.  Call light and belongings within reach.  Bed locked and in lowest position.  Hourly rounding.  Needs currently met.               The patient is Stable - Low risk of patient condition declining or worsening    Shift Goals  Clinical Goals: monitor pain, n/v  Patient Goals: sleep, pain control  Family Goals: KRISTINE    Progress made toward(s) clinical / shift goals:      Problem: Pain - Standard  Goal: Alleviation of pain or a reduction in pain to the patient’s comfort goal  Outcome: Progressing     Problem: Knowledge Deficit - Standard  Goal: Patient and family/care givers will demonstrate understanding of plan of care, disease process/condition, diagnostic tests and medications  Outcome: Progressing     Problem: Hemodynamics  Goal: Patient's hemodynamics, fluid balance and neurologic status will be stable or improve  Outcome: Progressing     Problem: Fall Risk  Goal: Patient will remain free from falls  Outcome: Progressing       Patient is not progressing towards the following goals:

## 2025-02-05 NOTE — DISCHARGE SUMMARY
Discharge Summary    CHIEF COMPLAINT ON ADMISSION  Chief Complaint   Patient presents with    Painful Urination    Low Back Pain       Reason for Admission  flu like symp; back pain     Admission Date  2/2/2025    CODE STATUS  Prior    HPI & HOSPITAL COURSE  This is a  29 year-old female past medical significant asthma presented to hospital on 2/2/2025  with a complain of painful urination and left-sided flank pain.  On presentation in ER, she is noted to be tachycardic, febrile with a temperature as high as 103.3, WBC 20.2, CT renal showing hazy right perinephric fat stranding with minimal prominence in the right ureter no obstructing stone with hepatomegaly. She was admitted for sepsis secondary to urinary source. Urine culture and blood culture positive for E. Coli, sensitivity reviewed. Patient received 3 days of IV Rocephin, will continue Bactrim for 4 more days to complete 7 days course. Electrolytes abnormality were replaced and monitored.      Therefore, she is discharged in good and stable condition to home with close outpatient follow-up.    The patient met 2-midnight criteria for an inpatient stay at the time of discharge.    Discharge Date  2/5/2025    FOLLOW UP ITEMS POST DISCHARGE  - Follow up with primary care physician in 1 week.     - Please take the medications as instructed    - Go to the local Emergency Department if you have any worsening condition.     DISCHARGE DIAGNOSES  Principal Problem:    Severe sepsis (HCC) (POA: Yes)  Active Problems:    Obesity (BMI 35.0-39.9 without comorbidity) (POA: Yes)    Hypokalemia (POA: Yes)    Pyelonephritis (POA: Yes)    Bacteremia (POA: Yes)    Lactic acidosis (POA: Unknown)    Leukocytosis (POA: Unknown)    Hypophosphatemia (POA: Unknown)    Hypomagnesemia (POA: Unknown)    Nausea & vomiting (POA: Unknown)  Resolved Problems:    * No resolved hospital problems. *      FOLLOW UP  No future appointments.  [facility]  [address]  "[facility] [address]  [phone]  Call in 1 week(s)      [facility]  [address] [facility] [address]  [phone]  Call in 1 week(s)      Pcp Pt States None            MEDICATIONS ON DISCHARGE     Medication List        START taking these medications        Instructions   sulfamethoxazole-trimethoprim 800-160 MG tablet  Commonly known as: Bactrim DS   Take 1 Tablet by mouth 2 times a day for 4 days.  Dose: 1 Tablet            CONTINUE taking these medications        Instructions   acetaminophen 500 MG Tabs  Commonly known as: Tylenol   Take 500 mg by mouth every 6 hours as needed for Mild Pain or Fever.  Dose: 500 mg     AZO Urinary Pain Relief 99.5 MG Tabs  Generic drug: Phenazopyridine HCl   Take 199 mg by mouth 3 times a day as needed (Urinary Pain). 2 tablets = 199 mg.  Dose: 199 mg     ethinyl estradiol-etonogestrel 0.12-0.015 MG/24HR vaginal ring  Commonly known as: Nuvaring   Insert 1 Each into the vagina every 4 weeks. Insert 1 ring into the vagina and leave in place for 3 weeks. Remove for 1 week before replacing with new ring.  Dose: 1 Each     NON SPECIFIED   Take 1 Tablet by mouth every evening. \"Kfin Gnow\"  OTC weight loss supplement.  Dose: 1 Tablet     vitamin C 250 MG Tabs   Take 250 mg by mouth 2 times a day.  Dose: 250 mg              Allergies  Allergies   Allergen Reactions    Peanut-Derived Rash     Redness and dryness affecting face    [place]brush Runny Nose             DIET  No orders of the defined types were placed in this encounter.      ACTIVITY  As tolerated.  Weight bearing as tolerated    CONSULTATIONS      PROCEDURES      LABORATORY  Lab Results   Component Value Date    SODIUM 137 02/05/2025    POTASSIUM 3.4 (L) 02/05/2025    CHLORIDE 104 02/05/2025    CO2 23 02/05/2025    GLUCOSE 97 02/05/2025    BUN 5 (L) 02/05/2025    CREATININE 0.58 02/05/2025    CREATININE 0.7 12/02/2008        Lab Results   Component Value Date "    WBC 5.9 02/05/2025    HEMOGLOBIN 10.2 (L) 02/05/2025    HEMATOCRIT 31.3 (L) 02/05/2025    PLATELETCT 177 02/05/2025        Total time of the discharge process exceeds 36 minutes.

## 2025-02-05 NOTE — CARE PLAN
Pt AO x 4.  Pt denies pain during initial assessment.  Call light and belongings within reach.  Bed locked and in lowest position.  Hourly rounding.  Needs currently met.         The patient is Stable - Low risk of patient condition declining or worsening    Shift Goals  Clinical Goals: monitor headache, n/v  Patient Goals: comfort  Family Goals: comfort    Progress made toward(s) clinical / shift goals:      Problem: Pain - Standard  Goal: Alleviation of pain or a reduction in pain to the patient’s comfort goal  Outcome: Met     Problem: Knowledge Deficit - Standard  Goal: Patient and family/care givers will demonstrate understanding of plan of care, disease process/condition, diagnostic tests and medications  Outcome: Met     Problem: Hemodynamics  Goal: Patient's hemodynamics, fluid balance and neurologic status will be stable or improve  Outcome: Met     Problem: Fluid Volume  Goal: Fluid volume balance will be maintained  Outcome: Met     Problem: Urinary - Renal Perfusion  Goal: Ability to achieve and maintain adequate renal perfusion and functioning will improve  Outcome: Met     Problem: Respiratory  Goal: Patient will achieve/maintain optimum respiratory ventilation and gas exchange  Outcome: Met     Problem: Mechanical Ventilation  Goal: Patient will be able to express needs and understand communication  Outcome: Met     Problem: Physical Regulation  Goal: Diagnostic test results will improve  Outcome: Met  Goal: Signs and symptoms of infection will decrease  Outcome: Met     Problem: Fall Risk  Goal: Patient will remain free from falls  Outcome: Met     Patient is not progressing towards the following goals:

## 2025-02-05 NOTE — PROGRESS NOTES
Discharge orders received.  Patient arrived to the discharge lounge.  PIV removed.  Instructions given, medications reviewed and general discharge education provided to patient.  Follow up appointments discussed.  Patient verbalized understanding of dc instructions and prescriptions.  Patient signed discharge instructions.  Patient verbalized understanding had all belongings with her.  Patient pending meds to beds. Wished patient a speedy recovery.

## 2025-02-05 NOTE — DISCHARGE INSTRUCTIONS
- Follow up with primary care physician in 1 week.     - Please take the medications as instructed    - Go to the local Emergency Department if you have any worsening condition.

## 2025-02-05 NOTE — PROGRESS NOTES
Pt considered a moderate fall risk.  Pt refuses bed alarm.  Pt educated on fall risk and the purpose of the bed alarm. Pt verbalizes an understanding of the education and risks of not having the bed alarm on. Pt is and is A&Ox 4. Charge nurse notified

## 2025-02-05 NOTE — PROGRESS NOTES
Hospital Medicine Daily Progress Note    Date of Service  2/4/2025    Chief Complaint  Bette Aguilar is a 29 y.o. female admitted 2/2/2025 with   Chief Complaint   Patient presents with    Painful Urination    Low Back Pain         Hospital Course  This is a  29 year-old female past medical significant asthma presented to hospital on 2/2/2025  with a complain of painful urination and left-sided flank pain.  Patient stated that she had dysuria a week ago some, started taking Azo which somehow helped her.  But after she stopped taking, started having left-sided flank pain her pain started along with nausea, vomiting, headache, subjective fever decided to come to ER  On presentation in ER, she is noted to be tachycardic, febrile with a temperature as high as 103.3, WBC 20.2, CT renal showing hazy right perinephric fat stranding with minimal prominence in the right ureter no obstructing stone with hepatomegaly.    Blood cultures x 2 was obtained, UA, urine culture was obtained, patient was started on IV antibiotics.  She was admitted for sepsis secondary to urinary source    Interval events:  I have seen and examined the patient at bedside  Reported nausea vomiting, headache.     2/2 blood culture positive for E. Coli, sensitivity reviewed I de-escalated antibiotics to Ancef from Rocephin  Repeated blood culture 2/4    K 3.4, Phos 1.8, mag 1.5  Replaced    I have discussed this patient's plan of care and discharge plan at IDT rounds today with Case Management, Nursing, Nursing leadership, and other members of the IDT team.    Consultants/Specialty  None    Code Status  Full Code    Disposition  The patient is not medically cleared for discharge to home or a post-acute facility.      I have placed the appropriate orders for post-discharge needs.    Review of Systems  Review of Systems   Constitutional:  Positive for malaise/fatigue.   HENT:  Positive for congestion.    Eyes:  Negative for blurred vision and double  vision.   Respiratory:  Negative for cough.    Cardiovascular:  Negative for chest pain.   Gastrointestinal:  Positive for nausea.   Genitourinary:  Positive for flank pain. Negative for hematuria.   Musculoskeletal:  Positive for myalgias.   Psychiatric/Behavioral:  The patient is nervous/anxious.         Physical Exam  Temp:  [35.9 °C (96.7 °F)-36.5 °C (97.7 °F)] 36.5 °C (97.7 °F)  Pulse:  [92-99] 92  Resp:  [16-18] 18  BP: ()/(55-64) 107/64  SpO2:  [92 %-95 %] 92 %    Physical Exam  Vitals reviewed.   Constitutional:       Appearance: She is obese.   HENT:      Head: Normocephalic and atraumatic.   Eyes:      Extraocular Movements: Extraocular movements intact.      Pupils: Pupils are equal, round, and reactive to light.   Cardiovascular:      Rate and Rhythm: Normal rate.   Pulmonary:      Effort: No respiratory distress.      Breath sounds: Normal breath sounds.   Abdominal:      General: Bowel sounds are normal. There is no distension.      Palpations: Abdomen is soft.      Tenderness: There is right CVA tenderness.   Musculoskeletal:      Cervical back: Neck supple.      Right lower leg: No edema.      Left lower leg: No edema.   Skin:     General: Skin is warm.   Neurological:      Mental Status: She is alert and oriented to person, place, and time.      Cranial Nerves: No cranial nerve deficit.         Fluids    Intake/Output Summary (Last 24 hours) at 2/4/2025 1743  Last data filed at 2/4/2025 1015  Gross per 24 hour   Intake 3704.31 ml   Output 175 ml   Net 3529.31 ml        Laboratory  Recent Labs     02/02/25  0957 02/03/25  0150 02/04/25  0605   WBC 20.2* 15.5* 9.9   RBC 4.76 3.82* 3.59*   HEMOGLOBIN 13.6 10.9* 10.3*   HEMATOCRIT 40.3 32.8* 30.6*   MCV 84.7 85.9 85.2   MCH 28.6 28.5 28.7   MCHC 33.7 33.2 33.7   RDW 39.5 40.2 39.4   PLATELETCT 227 159* 141*   MPV 10.2 10.0 10.4     Recent Labs     02/02/25  0957 02/03/25  0150 02/04/25  0605   SODIUM 134* 133* 135   POTASSIUM 3.1* 3.0* 3.4*    CHLORIDE 98 102 102   CO2 20 22 24   GLUCOSE 125* 117* 131*   BUN 7* 6* 4*   CREATININE 0.84 0.74 0.56   CALCIUM 8.7 7.1* 7.6*                   Imaging  CT-RENAL COLIC EVALUATION(A/P W/O)   Final Result         1.  Hazy right perinephric fat stranding with minimal prominence of the right ureter, no obstructing stone is appreciated, consider recently passed stone or changes of urinary tract infection. Correlate with urinalysis.   2.  Mild fluid-filled distention of small bowel, consider ileus and/or enteritis versus evolving obstructive changes. Radiographic follow-up to resolution recommended as clinically appropriate.   3.  Hepatomegaly      DX-CHEST-PORTABLE (1 VIEW)   Final Result         1. No acute cardiopulmonary abnormalities identified.                          Assessment/Plan  * Severe sepsis (HCC)- (present on admission)  Assessment & Plan  This is Sepsis Present on admission  SIRS criteria identified on my evaluation include: Tachycardia, with heart rate greater than 90 BPM and Leukocytosis, with WBC greater than 12,000  Clinical indicators of end organ dysfunction include Lactic Acid greater than 2  Source is urinary  Sepsis protocol initiated  Crystalloid Fluid Administration: Fluid resuscitation ordered per standard protocol - 30 mL/kg per current or ideal body weight  IV antibiotics as appropriate for source of sepsis  Reassessment: I have reassessed the patient's hemodynamic status    Patient is diaphoretic, blood culture x 2 positive.  Repeat blood culture ordered for 2/4/2025  Continue IV fluid  Continue IV Rocephin monitor patient's clinical status        Nausea & vomiting  Assessment & Plan  Continue IV fluid  Antiemesis as needed    Hypomagnesemia  Assessment & Plan  I ordered IV replacement    Hypophosphatemia  Assessment & Plan  Replaced  I ordered a.m. labs to monitor    Leukocytosis  Assessment & Plan  Trending down from 20-15    Lactic acidosis  Assessment & Plan  Secondary to  sepsis.  Started on sepsis order set.  Every 4 hours lactic acid evaluation.    Bacteremia- (present on admission)  Assessment & Plan  Gram-negative bacteremia possibly the source is being urine.  Repeat blood culture for tomorrow ordered, patient is currently on Rocephin.    Pyelonephritis- (present on admission)  Assessment & Plan  Patient found to have acute pyelonephritis and she has right-sided CVA tenderness pain  CT reviewed  Blood cx x 2: +ve for gram negative bacteremia   -- UA consistent with UTI.  Patient is currently being treated for Pyelo with IV antibiotics.    Hypokalemia- (present on admission)  Assessment & Plan  Replaced    Obesity (BMI 35.0-39.9 without comorbidity)- (present on admission)  Assessment & Plan  Lifestyle modifications  Outpatient follow-up.         VTE prophylaxis: Lovenox    Total time spent 52 minutes. I spent greater than 50% of the time for patient care, counseling, and coordination on this date, including unit/floor time, and face-to-face time with the patient as per interval events, my own review of patient's imaging and lab analysis and developing my assessment and plan above.    VTE Selection

## 2025-02-07 LAB
BACTERIA BLD CULT: NORMAL
SIGNIFICANT IND 70042: NORMAL
SITE SITE: NORMAL
SOURCE SOURCE: NORMAL

## 2025-08-26 ENCOUNTER — HOSPITAL ENCOUNTER (OUTPATIENT)
Dept: RADIOLOGY | Facility: MEDICAL CENTER | Age: 30
End: 2025-08-26
Attending: PHYSICIAN ASSISTANT
Payer: COMMERCIAL

## 2025-08-26 ENCOUNTER — OFFICE VISIT (OUTPATIENT)
Dept: URGENT CARE | Facility: PHYSICIAN GROUP | Age: 30
End: 2025-08-26
Payer: COMMERCIAL

## 2025-08-26 VITALS
TEMPERATURE: 97.7 F | OXYGEN SATURATION: 97 % | HEIGHT: 62 IN | SYSTOLIC BLOOD PRESSURE: 120 MMHG | WEIGHT: 213.52 LBS | BODY MASS INDEX: 39.29 KG/M2 | RESPIRATION RATE: 16 BRPM | HEART RATE: 89 BPM | DIASTOLIC BLOOD PRESSURE: 78 MMHG

## 2025-08-26 DIAGNOSIS — M79.605 LEFT LEG PAIN: Primary | ICD-10-CM

## 2025-08-26 DIAGNOSIS — M79.605 LEFT LEG PAIN: ICD-10-CM

## 2025-08-26 PROCEDURE — 3074F SYST BP LT 130 MM HG: CPT | Performed by: PHYSICIAN ASSISTANT

## 2025-08-26 PROCEDURE — 73590 X-RAY EXAM OF LOWER LEG: CPT | Mod: LT

## 2025-08-26 PROCEDURE — 99214 OFFICE O/P EST MOD 30 MIN: CPT | Performed by: PHYSICIAN ASSISTANT

## 2025-08-26 PROCEDURE — 3078F DIAST BP <80 MM HG: CPT | Performed by: PHYSICIAN ASSISTANT

## 2025-08-26 ASSESSMENT — LIFESTYLE VARIABLES
AUDIT-C TOTAL SCORE: -1
HOW MANY STANDARD DRINKS CONTAINING ALCOHOL DO YOU HAVE ON A TYPICAL DAY: PATIENT DECLINED
SKIP TO QUESTIONS 9-10: 0
HOW OFTEN DO YOU HAVE A DRINK CONTAINING ALCOHOL: PATIENT DECLINED
HOW OFTEN DO YOU HAVE SIX OR MORE DRINKS ON ONE OCCASION: PATIENT DECLINED

## 2025-08-26 ASSESSMENT — FIBROSIS 4 INDEX: FIB4 SCORE: 0.79

## 2025-08-26 ASSESSMENT — ENCOUNTER SYMPTOMS: LEG PAIN: 1
